# Patient Record
(demographics unavailable — no encounter records)

---

## 2024-12-02 NOTE — HISTORY OF PRESENT ILLNESS
[FreeTextEntry1] : Referred by Dr. Ramos  77F with PMH of asthma, OA, fatty liver, chronic dizziness, HTN, HLD, T2DM, Stage 3 CKD (baseline Cr 1.5-1.9), chronic anemia (baseline Hg ~10), Afib s/p DCCV on 9/30/24 (on Xarelto), bifasicular block (LAFB and RBBB), non-obstructive CAD, HFpEF, moderate to severe MR, and severe LFLG AS (TTE 7/12/24: LVEF 57%, PV 3.49, P/MG 49/26, KRISTEN 0.63, SVi 29.23), referred for consultation of treatment options for severe AS.    Admitted at [facility] early July, 2024 for ADHF, underwent an Echo 7/9 revealing EF 50-55%, Trileaflet aortic valve with reduced systolic excursion and Severe aortic stenosis. was then transferred to [facility] for cardiac cath on 7/10.   Pt was also seen at the ED on or about 7/22 for hematuria which resolved.  Admitted at [facility] from Sept 22-Oct 2, 2024 with ADHF, NEHEMIAS, and asthma exac. Per discharge summary:  "iso CHF exacerbation 2/2 severe AS and PAH. Cardio, pulm and nephro consulted. CT chest remarkable for mosaic attenuation, scattered linear atelectasis/scarring. 3.5 cm pancreatic tail cyst. Patient started hydralazine 25mg q8h and IV steroids 40 q8h, and singular qd. Patient supplemented w/ K powder for hypokalemia and started on home dose of KCl. Transitioned from furosemide to torsemide due to better bioavailability. Patient was pending transfer to [facility] for TAVR, however, on 9/28, day 7 of hospitalization, patient noted to be tachy-mj on telemetry, HR 30-150s and started on amio loading. Patient had TTE guided cardioversion on 10/1, revealing LVEF 55-60%, severe AS and moderate to severe mitral regurg. Dr. Tomlin was consulted for TAVR and mitral valve replacement for a [facility] transfer, however, patient opted to have outpatient follow-up. Patient had a total of 4 during hospital stay. ================================= Cardiac Cath 7/10/24 Conclusion:  Mild CAD - 40% mid RCA  LV-AO peak gradient 23mmHg  TTE 7/12/24 Conclusion: 1. Technically difficult image quality. 2. Left ventricular cavity is normal in size. Left ventricular wall thickness is normal. Left ventricular systolic function is normal with an ejection fraction of 57 % by Chahal's method of disks. There are no regional wall motion abnormalities seen. 3. The right ventricle is not well visualized. 4. Structurally normal mitral valve with normal leaflet excursion. There is calcification of the mitral valve annulus. There is trace mitral regurgitation. 5. The aortic valve anatomy cannot be determined with reduced systolic excursion. There is calcification of the aortic valve leaflets. There is severe aortic stenosis. The peak transaortic velocity is 3.49 m/s, peak transaortic gradient is 48.7 mmHg and mean transaortic gradient is 26.0 mmHg with an LVOT/aortic valve VTI ratio of 0.22. The aortic valve area is estimated at 0.63 cm by the continuity equation. There is mild aortic regurgitation. 6. The left atrium is mildly dilated with an indexed volume of 38.98 ml/m. 7. Structurally normal tricuspid valve with normal leaflet excursion. There is mild to moderate tricuspid regurgitation. Estimated pulmonary artery systolic pressure is 52 mmHg, consistent with moderate pulmonary hypertension.  MARCY 9/30/24 CONCLUSIONS: 1. Left ventricular wall thickness is normal. Left ventricular systolic function is normal with an ejection fraction visually estimated at 55 to 60 %. There are no regional wall motion abnormalities seen. 2. Normal right ventricular cavity size, with normal wall thickness, and normal right ventricular systolic function. 3. Moderate to severe mitral regurgitation. The mitral regurgitant jet is eccentrically directed. 4. Left atrium is moderately dilated. 5. The right atrium is dilated. 6. Mild tricuspid regurgitation. 7. Mild left ventricular hypertrophy. 8. No evidence of left atrial or left atrial appendage thrombus. The left atrial appendage emptying velocity is low. 9. Normal pulmonary venous flow. 10. Severe aortic stenosis.The effective orifice area is estimated at 0.9 cm by 2D planimetry. 11. No pericardial effusion seen. 12. Grade I atheroma in descending aorta.  EKG 9/26/24: SB with SA, LAFB, RBBB rate 59, , , QTc 524  10/2/24 labs reviewed:  WBC 9.33 H/H 10.4/33.4 Plt 173 BUN/Cr 39/1.73 =================================  Today, patient reports that since being home, she has noted progressive dyspnea on exertion once again. She also reports orthopnea requiring her to sleep in a recliner. She has worsened LE edema. She denies chest pain, palpitations. She has been compliant with her medications.   [x] KCCQ - completed [x] 5MWT - 13, 10, 12 seconds [x] echo [ ] TAVR scans [ ] carotid US [ ] CT surgeon

## 2024-12-02 NOTE — PHYSICAL EXAM
[Well Developed] : well developed [Well Nourished] : well nourished [No Acute Distress] : no acute distress [Normal S1, S2] : normal S1, S2 [No Respiratory Distress] : no respiratory distress  [Normal Gait] : normal gait [Edema ___] : edema [unfilled] [Normal] : no rash, no skin lesions [Moves all extremities] : moves all extremities [No Focal Deficits] : no focal deficits [Alert and Oriented] : alert and oriented [Normal memory] : normal memory [de-identified] : Elevated JVP [de-identified] : Late peaking systolic murmur 3/6  [de-identified] : bibasilar crackles

## 2024-12-02 NOTE — HISTORY OF PRESENT ILLNESS
[FreeTextEntry1] : Referred by Dr. Ramos  77F with PMH of asthma, OA, fatty liver, chronic dizziness, HTN, HLD, T2DM, Stage 3 CKD (baseline Cr 1.5-1.9), chronic anemia (baseline Hg ~10), Afib s/p DCCV on 9/30/24 (on Xarelto), bifasicular block (LAFB and RBBB), non-obstructive CAD, HFpEF, moderate to severe MR, and severe LFLG AS (TTE 7/12/24: LVEF 57%, PV 3.49, P/MG 49/26, KRISTEN 0.63, SVi 29.23), referred for consultation of treatment options for severe AS.    Admitted at [facility] early July, 2024 for ADHF, underwent an Echo 7/9 revealing EF 50-55%, Trileaflet aortic valve with reduced systolic excursion and Severe aortic stenosis. was then transferred to [facility] for cardiac cath on 7/10.   Pt was also seen at the ED on or about 7/22 for hematuria which resolved.  Admitted at [facility] from Sept 22-Oct 2, 2024 with ADHF, NEHEMIAS, and asthma exac. Per discharge summary:  "iso CHF exacerbation 2/2 severe AS and PAH. Cardio, pulm and nephro consulted. CT chest remarkable for mosaic attenuation, scattered linear atelectasis/scarring. 3.5 cm pancreatic tail cyst. Patient started hydralazine 25mg q8h and IV steroids 40 q8h, and singular qd. Patient supplemented w/ K powder for hypokalemia and started on home dose of KCl. Transitioned from furosemide to torsemide due to better bioavailability. Patient was pending transfer to [facility] for TAVR, however, on 9/28, day 7 of hospitalization, patient noted to be tachy-mj on telemetry, HR 30-150s and started on amio loading. Patient had TTE guided cardioversion on 10/1, revealing LVEF 55-60%, severe AS and moderate to severe mitral regurg. Dr. Tomlin was consulted for TAVR and mitral valve replacement for a [facility] transfer, however, patient opted to have outpatient follow-up. Patient had a total of 4 during hospital stay. ================================= Cardiac Cath 7/10/24 Conclusion:  Mild CAD - 40% mid RCA  LV-AO peak gradient 23mmHg  TTE 7/12/24 Conclusion: 1. Technically difficult image quality. 2. Left ventricular cavity is normal in size. Left ventricular wall thickness is normal. Left ventricular systolic function is normal with an ejection fraction of 57 % by Chahal's method of disks. There are no regional wall motion abnormalities seen. 3. The right ventricle is not well visualized. 4. Structurally normal mitral valve with normal leaflet excursion. There is calcification of the mitral valve annulus. There is trace mitral regurgitation. 5. The aortic valve anatomy cannot be determined with reduced systolic excursion. There is calcification of the aortic valve leaflets. There is severe aortic stenosis. The peak transaortic velocity is 3.49 m/s, peak transaortic gradient is 48.7 mmHg and mean transaortic gradient is 26.0 mmHg with an LVOT/aortic valve VTI ratio of 0.22. The aortic valve area is estimated at 0.63 cm by the continuity equation. There is mild aortic regurgitation. 6. The left atrium is mildly dilated with an indexed volume of 38.98 ml/m. 7. Structurally normal tricuspid valve with normal leaflet excursion. There is mild to moderate tricuspid regurgitation. Estimated pulmonary artery systolic pressure is 52 mmHg, consistent with moderate pulmonary hypertension.  MARCY 9/30/24 CONCLUSIONS: 1. Left ventricular wall thickness is normal. Left ventricular systolic function is normal with an ejection fraction visually estimated at 55 to 60 %. There are no regional wall motion abnormalities seen. 2. Normal right ventricular cavity size, with normal wall thickness, and normal right ventricular systolic function. 3. Moderate to severe mitral regurgitation. The mitral regurgitant jet is eccentrically directed. 4. Left atrium is moderately dilated. 5. The right atrium is dilated. 6. Mild tricuspid regurgitation. 7. Mild left ventricular hypertrophy. 8. No evidence of left atrial or left atrial appendage thrombus. The left atrial appendage emptying velocity is low. 9. Normal pulmonary venous flow. 10. Severe aortic stenosis.The effective orifice area is estimated at 0.9 cm by 2D planimetry. 11. No pericardial effusion seen. 12. Grade I atheroma in descending aorta.  EKG 9/26/24: SB with SA, LAFB, RBBB rate 59, , , QTc 524  10/2/24 labs reviewed:  WBC 9.33 H/H 10.4/33.4 Plt 173 BUN/Cr 39/1.73 =================================  Today, patient reports that since being home, she has noted progressive dyspnea on exertion once again. She also reports orthopnea requiring her to sleep in a recliner. She has worsened LE edema. She denies chest pain, palpitations. She has been compliant with her medications.   [x] KCCQ - completed [x] 5MWT - 13, 10, 12 seconds [x] echo [ ] TAVR scans [ ] carotid US [ ] CT surgeon

## 2024-12-02 NOTE — PHYSICAL EXAM
[Well Developed] : well developed [Well Nourished] : well nourished [No Acute Distress] : no acute distress [Normal S1, S2] : normal S1, S2 [No Respiratory Distress] : no respiratory distress  [Normal Gait] : normal gait [Edema ___] : edema [unfilled] [Normal] : no rash, no skin lesions [Moves all extremities] : moves all extremities [No Focal Deficits] : no focal deficits [Alert and Oriented] : alert and oriented [Normal memory] : normal memory [de-identified] : Elevated JVP [de-identified] : Late peaking systolic murmur 3/6  [de-identified] : bibasilar crackles

## 2024-12-02 NOTE — PLAN
[TextEntry] : - Labs today in the office - Increase Bumex to 1mg BID - Plan to directly admit to the hospital for diuresis, medical optimization, and further TAVR workup. - Will discuss plan with Dr. Rachel FINLEY, Dr. Chucho Perdomo, personally performed the evaluation and management (E/M) services for this new patient. That E/M includes conducting the clinically appropriate initial history &/or exam, assessing all conditions, and establishing the plan of care. Today, my MAMADOU, noted herewith, was here to observe my evaluation and management service for this patient & follow plan of care established by me going forward.   
Additional Notes: Patient consent was obtained to proceed with the visit and recommended plan of care after discussion of all risks and benefits, including the risks of COVID-19 exposure.
Detail Level: Simple

## 2024-12-02 NOTE — PHYSICAL EXAM
[Well Developed] : well developed [Well Nourished] : well nourished [No Acute Distress] : no acute distress [Normal S1, S2] : normal S1, S2 [No Respiratory Distress] : no respiratory distress  [Normal Gait] : normal gait [Edema ___] : edema [unfilled] [Normal] : no rash, no skin lesions [Moves all extremities] : moves all extremities [No Focal Deficits] : no focal deficits [Alert and Oriented] : alert and oriented [Normal memory] : normal memory [de-identified] : Elevated JVP [de-identified] : Late peaking systolic murmur 3/6  [de-identified] : bibasilar crackles

## 2024-12-02 NOTE — HISTORY OF PRESENT ILLNESS
[FreeTextEntry1] : Referred by Dr. Ramos  77F with PMH of asthma, OA, fatty liver, chronic dizziness, HTN, HLD, T2DM, Stage 3 CKD (baseline Cr 1.5-1.9), chronic anemia (baseline Hg ~10), Afib s/p DCCV on 9/30/24 (on Xarelto), bifasicular block (LAFB and RBBB), non-obstructive CAD, HFpEF, moderate to severe MR, and severe LFLG AS (TTE 7/12/24: LVEF 57%, PV 3.49, P/MG 49/26, KRISTEN 0.63, SVi 29.23), referred for consultation of treatment options for severe AS.    Admitted at [facility] early July, 2024 for ADHF, underwent an Echo 7/9 revealing EF 50-55%, Trileaflet aortic valve with reduced systolic excursion and Severe aortic stenosis. was then transferred to [facility] for cardiac cath on 7/10.   Pt was also seen at the ED on or about 7/22 for hematuria which resolved.  Admitted at [facility] from Sept 22-Oct 2, 2024 with ADHF, NEHEMIAS, and asthma exac. Per discharge summary:  "iso CHF exacerbation 2/2 severe AS and PAH. Cardio, pulm and nephro consulted. CT chest remarkable for mosaic attenuation, scattered linear atelectasis/scarring. 3.5 cm pancreatic tail cyst. Patient started hydralazine 25mg q8h and IV steroids 40 q8h, and singular qd. Patient supplemented w/ K powder for hypokalemia and started on home dose of KCl. Transitioned from furosemide to torsemide due to better bioavailability. Patient was pending transfer to [facility] for TAVR, however, on 9/28, day 7 of hospitalization, patient noted to be tachy-mj on telemetry, HR 30-150s and started on amio loading. Patient had TTE guided cardioversion on 10/1, revealing LVEF 55-60%, severe AS and moderate to severe mitral regurg. Dr. Tomlin was consulted for TAVR and mitral valve replacement for a [facility] transfer, however, patient opted to have outpatient follow-up. Patient had a total of 4 during hospital stay. ================================= Cardiac Cath 7/10/24 Conclusion:  Mild CAD - 40% mid RCA  LV-AO peak gradient 23mmHg  TTE 7/12/24 Conclusion: 1. Technically difficult image quality. 2. Left ventricular cavity is normal in size. Left ventricular wall thickness is normal. Left ventricular systolic function is normal with an ejection fraction of 57 % by Chahal's method of disks. There are no regional wall motion abnormalities seen. 3. The right ventricle is not well visualized. 4. Structurally normal mitral valve with normal leaflet excursion. There is calcification of the mitral valve annulus. There is trace mitral regurgitation. 5. The aortic valve anatomy cannot be determined with reduced systolic excursion. There is calcification of the aortic valve leaflets. There is severe aortic stenosis. The peak transaortic velocity is 3.49 m/s, peak transaortic gradient is 48.7 mmHg and mean transaortic gradient is 26.0 mmHg with an LVOT/aortic valve VTI ratio of 0.22. The aortic valve area is estimated at 0.63 cm by the continuity equation. There is mild aortic regurgitation. 6. The left atrium is mildly dilated with an indexed volume of 38.98 ml/m. 7. Structurally normal tricuspid valve with normal leaflet excursion. There is mild to moderate tricuspid regurgitation. Estimated pulmonary artery systolic pressure is 52 mmHg, consistent with moderate pulmonary hypertension.  MARCY 9/30/24 CONCLUSIONS: 1. Left ventricular wall thickness is normal. Left ventricular systolic function is normal with an ejection fraction visually estimated at 55 to 60 %. There are no regional wall motion abnormalities seen. 2. Normal right ventricular cavity size, with normal wall thickness, and normal right ventricular systolic function. 3. Moderate to severe mitral regurgitation. The mitral regurgitant jet is eccentrically directed. 4. Left atrium is moderately dilated. 5. The right atrium is dilated. 6. Mild tricuspid regurgitation. 7. Mild left ventricular hypertrophy. 8. No evidence of left atrial or left atrial appendage thrombus. The left atrial appendage emptying velocity is low. 9. Normal pulmonary venous flow. 10. Severe aortic stenosis.The effective orifice area is estimated at 0.9 cm by 2D planimetry. 11. No pericardial effusion seen. 12. Grade I atheroma in descending aorta.  EKG 9/26/24: SB with SA, LAFB, RBBB rate 59, , , QTc 524  10/2/24 labs reviewed:  WBC 9.33 H/H 10.4/33.4 Plt 173 BUN/Cr 39/1.73 =================================  Today, patient reports that since being home, she has noted progressive dyspnea on exertion once again. She also reports orthopnea requiring her to sleep in a recliner. She has worsened LE edema. She denies chest pain, palpitations. She has been compliant with her medications.   [x] KCCQ - completed [x] 5MWT - 13, 10, 12 seconds [x] echo [ ] TAVR scans [ ] carotid US [ ] CT surgeon

## 2024-12-02 NOTE — ASSESSMENT
[FreeTextEntry1] : 77F with PMH of asthma, OA, fatty liver, chronic dizziness, HTN, HLD, T2DM, Stage 3 CKD (baseline Cr 1.5-1.9), chronic anemia (baseline Hg ~10), Afib s/p DCCV on 9/30/24 (on Xarelto), bifasicular block (LAFB and RBBB), non-obstructive CAD, HFpEF, moderate to severe MR, and severe LFLG AS (TTE 7/12/24: LVEF 57%, PV 3.49, P/MG 49/26, KRISTEN 0.63, SVi 29.23), referred for consultation of treatment options for severe AS. Her echocardiogram in the office today reveals normal LV and RV function, severe AS with KRISTEN 0.8, MG 42, Vmax 4.2. She is also markedly volume overloaded on exam with symptoms of ongoing dyspnea and orthopnea. We will plan to admit her to the hospital directly for aggressive diuresis and further workup for TAVR.

## 2024-12-02 NOTE — REVIEW OF SYSTEMS
[Feeling Fatigued] : feeling fatigued [SOB] : shortness of breath [Dyspnea on exertion] : dyspnea during exertion [Lower Ext Edema] : lower extremity edema [Orthopnea] : orthopnea [Negative] : Heme/Lymph [Fever] : no fever [Headache] : no headache [Chills] : no chills [Chest Discomfort] : no chest discomfort [Palpitations] : no palpitations [PND] : no PND [Syncope] : no syncope [FreeTextEntry6] : dyspnea

## 2024-12-27 NOTE — HISTORY OF PRESENT ILLNESS
[FreeTextEntry1] : Hospital Course: Discharge Date 24-Dec-2024 Admission Date 19-Dec-2024 17:55 Reason for Admission severe AS Hospital Course Patient discussed on morning rounds with Dr. Perdomo Operation Date: 12/20/24 TAVR Primary Surgeon/Attending MD: Dr. Perdomo / Dr. Mancilla Referring Physician: Dr Ramos _ _ _ _ _ _ _ _ _ _ _ _ HOSPITAL COURSE: 77 year old female with history of asthma, HTN, HLD, DM2, CKD3, chronic anemia, AFib s/p DCCV 09/2024 (though documented that patient had some AFib in early December as well) on Xarelto, HFpEF, moderate to severe MR, and severe low-flow low-gradient AS, who was found by her home nurse to be bradycardic in the 40's associated with dizziness and was sent to [facility] ED 12/19 and was further admitted to structural heart service for TAVR work up. In preop setting, Heparin drip started for afib while off NOAC. Patient also with persisting bradycardia and Hx of bifascicular block and wide QRS of 154, EP consulted for likely pacemaker implantation after TAVR. She underwent Transfemoral (RCFA) TAVR with 23mm Tawny valve on 12/20/24 with Dr. Perdomo / Dr. Mancilla. Intraop course was uneventful. Post procedure TTE on POD#1 showed TAVR valve is seen in the aortic position with apparent normal function, without evidence of prosthetic dysfunction, and trivial pericardial effusion. TVP via RIJ was maintained in placed until 12/23 when patient underwent uncomplicated dual chamber MRI conditionally approved Medtronic conduction system pacemaker. Otherwise, patient recovered well and AVNB were resumed. Repeat TTE on day of discharge did not show pericardial effusion. AC plan discussed with EP and patient was cleared to restart NOAC (Xarelto) on 12/24/24.  Per Dr. Perdomo, patient is stable for discharge with follow up. _ _ _ _ _ _ _ _ _ _ _ _ Today patient was seen ( present) s/p discharge from  [facility]. Patient informed they are being followed by [facility] program. Time was spent with the patient reviewing the discharge material including medications, follow up appointments, recovery, concerning symptoms, and how to contact me should they have questions.

## 2024-12-27 NOTE — ASSESSMENT
[FreeTextEntry1] : Pt recovering well at home s/p TAVR . Reviewed all medications and dosages with pt understanding. Pt dispenses meds appropriately into med dispenser each week. Pt has all medications in home and is taking as prescribed.  Reports BS is 100 this moring after breakfast. Reviewed iportace of daily weights and DASH diet. Patient is ambulating independently.    Denies pain at this time. No new symptoms, issues or concerns to report at this time. Appt schedule reviewed with pt verbalizing understanding.

## 2024-12-27 NOTE — PHYSICAL EXAM
[Neck Appearance] : the appearance of the neck was normal [] : no respiratory distress [FreeTextEntry1] : ppm site c/d/i [FreeTextEntry2] : Skin clean, dry and healing well. The groin incision had no active bleeding,, no purulent drainage and no serosanguineous drainage. [Abnormal Walk] : normal gait [Skin Color & Pigmentation] : normal skin color and pigmentation [No Focal Deficits] : no focal deficits [Oriented To Time, Place, And Person] : oriented to person, place, and time

## 2025-01-03 NOTE — DATA REVIEWED
[FreeTextEntry1] : Cardiac Cath 7/10/24 Conclusion:  Mild CAD - 40% mid RCA  LV-AO peak gradient 23mmHg  TTE 7/12/24 Conclusion: 1. Technically difficult image quality. 2. Left ventricular cavity is normal in size. Left ventricular wall thickness is normal. Left ventricular systolic function is normal with an ejection fraction of 57 % by Chahal's method of disks. There are no regional wall motion abnormalities seen. 3. The right ventricle is not well visualized. 4. Structurally normal mitral valve with normal leaflet excursion. There is calcification of the mitral valve annulus. There is trace mitral regurgitation. 5. The aortic valve anatomy cannot be determined with reduced systolic excursion. There is calcification of the aortic valve leaflets. There is severe aortic stenosis. The peak transaortic velocity is 3.49 m/s, peak transaortic gradient is 48.7 mmHg and mean transaortic gradient is 26.0 mmHg with an LVOT/aortic valve VTI ratio of 0.22. The aortic valve area is estimated at 0.63 cm by the continuity equation. There is mild aortic regurgitation. 6. The left atrium is mildly dilated with an indexed volume of 38.98 ml/m. 7. Structurally normal tricuspid valve with normal leaflet excursion. There is mild to moderate tricuspid regurgitation. Estimated pulmonary artery systolic pressure is 52 mmHg, consistent with moderate pulmonary hypertension.  MARCY 9/30/24 CONCLUSIONS: 1. Left ventricular wall thickness is normal. Left ventricular systolic function is normal with an ejection fraction visually estimated at 55 to 60 %. There are no regional wall motion abnormalities seen. 2. Normal right ventricular cavity size, with normal wall thickness, and normal right ventricular systolic function. 3. Moderate to severe mitral regurgitation. The mitral regurgitant jet is eccentrically directed. 4. Left atrium is moderately dilated. 5. The right atrium is dilated. 6. Mild tricuspid regurgitation. 7. Mild left ventricular hypertrophy. 8. No evidence of left atrial or left atrial appendage thrombus. The left atrial appendage emptying velocity is low. 9. Normal pulmonary venous flow. 10. Severe aortic stenosis. The effective orifice area is estimated at 0.9 cm by 2D planimetry. 11. No pericardial effusion seen. 12. Grade I atheroma in descending aorta.  EKG 9/26/24: SB with SA, LAFB, RBBB rate 59, , , QTc 524  Echo 7/9/24: EF 50-55%, Trileaflet aortic valve with reduced systolic excursion and Severe aortic stenosis.

## 2025-01-03 NOTE — PLAN
[TextEntry] : (1) Increase Coreg to 12.5 mg PO BID; will record AM BP and afternoon BP.  (2) Return to [facility] in three weeks with ECHO, EKG and labs.  (3) Continue current medication regimen and cardiology care with Dr. Ramos. (4) Abx prophylaxis with dental procedures.

## 2025-01-03 NOTE — REVIEW OF SYSTEMS
[Dyspnea on exertion] : dyspnea during exertion [Feeling Fatigued] : feeling fatigued [Negative] : Musculoskeletal [Fever] : no fever [Headache] : no headache [Chills] : no chills [SOB] : no shortness of breath [Chest Discomfort] : no chest discomfort [Lower Ext Edema] : no extremity edema [Leg Claudication] : no intermittent leg claudication [Palpitations] : no palpitations [Orthopnea] : no orthopnea [PND] : no PND [Syncope] : no syncope [FreeTextEntry6] : dyspnea

## 2025-01-03 NOTE — REASON FOR VISIT
[Home] : at home, [unfilled] , at the time of the visit. [Medical Office: ([facility])___] : at the medical office located in  [Family Member] : family member [Patient] : the patient [Self] : self [Follow-Up: _____] : a [unfilled] follow-up visit [FreeTextEntry4] : Miguel Newman [FreeTextEntry1] : s/p TAVR

## 2025-01-03 NOTE — PHYSICAL EXAM
[Well Developed] : well developed [Well Nourished] : well nourished [No Acute Distress] : no acute distress [Normal S1, S2] : normal S1, S2 [No Respiratory Distress] : no respiratory distress  [Normal Gait] : normal gait [Edema ___] : edema [unfilled] [Normal] : no rash, no skin lesions [Moves all extremities] : moves all extremities [No Focal Deficits] : no focal deficits [Alert and Oriented] : alert and oriented [Normal memory] : normal memory [de-identified] : Elevated JVP [de-identified] : Late peaking systolic murmur 3/6  [de-identified] : bibasilar crackles [TextEntry] : Not done as telehealth visit.

## 2025-01-03 NOTE — HISTORY OF PRESENT ILLNESS
[FreeTextEntry1] : Referred by Dr. Ramos  77 year old female with a PMH of asthma, OA, fatty liver, chronic dizziness, HTN, HLD, T2DM, Stage 3 CKD (baseline Cr 1.5-1.9), chronic anemia (baseline Hg ~10), atrial fibrillation s/p DCCV on 9/30/24 (on Xarelto), bifasicular block (LAFB and RBBB), non-obstructive CAD and chronic diastolic heart failure with severe low flow, low gradient aortic stenosis s/p transfemoral TAVR with Tawny Ultra (23mm) on 12/20/24 c/b CHB followed by dual-chamber Medtronic PPM on 12/23/24 who presents for follow up after discharge.   The patient was admitted to [facility] on 12/2 for heart failure optimization after being seen in clinic with marked volume overload and symptoms of ongoing dyspnea and orthopnea. Over a week, the patient underwent careful diuresis in the setting of fluctuating Cr. After adequate diuresis, the patient was discharged home with a plan to return for TAVR. Renal recommended discharge home on Bumex 1mg PRN and Coreg 6.25 mg BID (1/2 home dose).   Prior to her planned admission for TAVR, the patient was found by her home nurse to be bradycardic in the 40's associated with dizziness and was sent to [facility] ED on 12/19.  Given the persistent bradycardia and a history of bifascicular block with QRS of 154, EP was consulted for likely pacemaker implantation after TAVR. The patient underwent TAVR on 12/20/24 without complications but went into complete heart block. TVP via RIJ was maintained in placed until 12/23 when patient underwent uncomplicated dual chamber Medtronic pacemaker. She was deemed stable for discharge home on 12/24/24. ECHO done prior to discharge showed the following:  ECHO, 12/24/24: Normal left ventricular size and systolic function. Mild symmetric left ventricular hypertrophy. Grade II left ventricular diastolic dysfunction. Normal right ventricular size and systolic function. Device leads are noted in the right heart. Biatrial enlargement. 23 mm Tawny 3 Ultra valve is noted in the aortic position without any aortic regurgitation. The peak transvalvular velocity is 2.41 m/s, the mean transvalvular gradient is 12.00 mmHg, and the LVOT/AV velocity ratio is 0.53. The peak transaortic gradient is 23.23 mmHg. Anterior mitral leaflet is likely mildly prolapsed at A2 scallop, not well visualized. Mild eccentric mitral regurgitation. No pericardial effusion.  Since discharge, the patient states she is feeling better. She denies any chest pain and reports her RODRIGUEZ has markedly improved. She reports some mild SOB with activity; denies any SOB at rest. The patient also denies orthopnea, PND, dizziness, syncope, LE edema and palpitations. The groin sites are healing well; denies pain, bleeding and swelling. The PPM site has bruising but denies any additional swelling or significant pain. She reports her SBP in the mornings has been high, ranging from 165 to low 180s. After morning medications, it remains high in the 170s.

## 2025-01-03 NOTE — ASSESSMENT
[FreeTextEntry1] : 77 year old female with a PMH of asthma, OA, fatty liver, chronic dizziness, HTN, HLD, T2DM, Stage 3 CKD (baseline Cr 1.5-1.9), chronic anemia (baseline Hg ~10), atrial fibrillation s/p DCCV on 9/30/24 (on Xarelto), bifasicular block (LAFB and RBBB), non-obstructive CAD and chronic diastolic heart failure with severe low flow, low gradient aortic stenosis s/p transfemoral TAVR with Tawny Ultra (23mm) on 12/20/24 c/b CHB followed by dual-chamber Medtronic PPM on 12/23/24 who presents for follow up after discharge. The patient is doing well since discharge. She is clinically stable; NYHA Class II. Her RODRIGUEZ is improving and has no signs/symptoms of volume overload. The ECHO done prior to discharge was reviewed. We reviewed her home BP recordings and has been high. Patient to increase Coreg to 12.5mg PO BID (prior home dose). I instructed her to record AM BP prior to medication and then again several hours later; will call our office if SBP remains high and/or is lower than 115. Instructions were also reviewed with her son, Trent. The patient will return to D clinic in three weeks with ECHO, EKG and labs. All questions were answered.

## 2025-01-03 NOTE — PHYSICAL EXAM
[Well Developed] : well developed [Well Nourished] : well nourished [No Acute Distress] : no acute distress [Normal S1, S2] : normal S1, S2 [No Respiratory Distress] : no respiratory distress  [Normal Gait] : normal gait [Edema ___] : edema [unfilled] [Normal] : no rash, no skin lesions [Moves all extremities] : moves all extremities [No Focal Deficits] : no focal deficits [Alert and Oriented] : alert and oriented [Normal memory] : normal memory [de-identified] : Elevated JVP [de-identified] : Late peaking systolic murmur 3/6  [de-identified] : bibasilar crackles [TextEntry] : Not done as telehealth visit.

## 2025-01-03 NOTE — HISTORY OF PRESENT ILLNESS
[FreeTextEntry1] : Referred by Dr. Ramos  77 year old female with a PMH of asthma, OA, fatty liver, chronic dizziness, HTN, HLD, T2DM, Stage 3 CKD (baseline Cr 1.5-1.9), chronic anemia (baseline Hg ~10), atrial fibrillation s/p DCCV on 9/30/24 (on Xarelto), bifasicular block (LAFB and RBBB), non-obstructive CAD and chronic diastolic heart failure with severe low flow, low gradient aortic stenosis s/p transfemoral TAVR with Tawny Ultra (23mm) on 12/20/24 c/b CHB followed by dual-chamber Medtronic PPM on 12/23/24 who presents for follow up after discharge.   The patient was admitted to [facility] on 12/2 for heart failure optimization after being seen in clinic with marked volume overload and symptoms of ongoing dyspnea and orthopnea. Over a week, the patient underwent careful diuresis in the setting of fluctuating Cr. After adequate diuresis, the patient was discharged home with a plan to return for TAVR. Renal recommended discharge home on Bumex 1mg PRN and Coreg 6.25 mg BID (1/2 home dose).   Prior to her planned admission for TAVR, the patient was found by her home nurse to be bradycardic in the 40's associated with dizziness and was sent to [facility] ED on 12/19.  Given the persistent bradycardia and a history of bifascicular block with QRS of 154, EP was consulted for likely pacemaker implantation after TAVR. The patient underwent TAVR on 12/20/24 without complications but went into complete heart block. TVP via RIJ was maintained in placed until 12/23 when patient underwent uncomplicated dual chamber Medtronic pacemaker. She was deemed stable for discharge home on 12/24/24. ECHO done prior to discharge showed the following:  ECHO, 12/24/24: Normal left ventricular size and systolic function. Mild symmetric left ventricular hypertrophy. Grade II left ventricular diastolic dysfunction. Normal right ventricular size and systolic function. Device leads are noted in the right heart. Biatrial enlargement. 23 mm Tawny 3 Ultra valve is noted in the aortic position without any aortic regurgitation. The peak transvalvular velocity is 2.41 m/s, the mean transvalvular gradient is 12.00 mmHg, and the LVOT/AV velocity ratio is 0.53. The peak transaortic gradient is 23.23 mmHg. Anterior mitral leaflet is likely mildly prolapsed at A2 scallop, not well visualized. Mild eccentric mitral regurgitation. No pericardial effusion.  Since discharge, the patient states she is feeling better. She denies any chest pain and reports her RODRIGUEZ has markedly improved. She reports some mild SOB with activity; denies any SOB at rest. The patient also denies orthopnea, PND, dizziness, syncope, LE edema and palpitations. The groin sites are healing well; denies pain, bleeding and swelling. The PPM site has bruising but denies any additional swelling or significant pain. She reports her SBP in the mornings has been high, ranging from 165 to low 180s. After morning medications, it remains high in the 170s.

## 2025-01-03 NOTE — REASON FOR VISIT
[Home] : at home, [unfilled] , at the time of the visit. [Medical Office: ([facility])___] : at the medical office located in  [Family Member] : family member [Patient] : the patient [Self] : self [Follow-Up: _____] : a [unfilled] follow-up visit [FreeTextEntry4] : Miguel Newman [FreeTextEntry1] : s/p TAVR none

## 2025-01-22 NOTE — PROCEDURE
[No] : not [Atrial Fibrillation] : atrial fibrillation [Pacemaker] : pacemaker [Medtronic] : Medtronic [de-identified] : 12/23/24 [de-identified] : see paceart

## 2025-01-22 NOTE — HISTORY OF PRESENT ILLNESS
[None] : The patient complains of no symptoms [de-identified] : 77 year old female with history of asthma, HTN, HLD, DM2, CKD3, chronic anemia, AFib s/p DCCV 09/2024 (though documented that patient had some AFib in early December as well) on Xarelto, HFpEF, moderate to severe MR, and severe low-flow low-gradient AS, who was found by her home nurse to be bradycardic in the 40's associated with dizziness and was sent to [facility] ED 12/19 and was further admitted to structural heart service for TAVR work up. Baseline rhythm of sinus bradycardia and bifascicular block (QRS 154ms). She underwent Transfemoral (RCFA) TAVR with 23mm Tawny valve on 12/20/24 with Dr. Perdomo / Dr. Mancilla. On 12/23 she underwent PPM-D w/ CSP (Medtronic). She presents today for wound/device check.   She offers no device related complaints. Denies pain, bleeding, redness, swelling, or pain. She has her bedside monitor pluggedin. She presents with her son-in-law who is serving as her .

## 2025-01-24 NOTE — HISTORY OF PRESENT ILLNESS
[FreeTextEntry1] : 78 y/o F with hx of HTN, HLD, DM, CKD3 (baseline Cr 1.5-1.9), chronic anemia, AFIB s/p DCCV on 9/30/24 (on Xarelto), bifascicular block (LAFB and RBBB), non-obstructive CAD, HFpEF, moderate to severe MR, asthma, OA, fallty liver, chronic dizziness

## 2025-02-26 NOTE — END OF VISIT
[FreeTextEntry3] : I, Dr. Luis Bishop, personally performed the evaluation and management (E/M) services for this established patient who presents today with (a) new problem(s)/exacerbation of (an) existing condition(s). That E/M includes conducting the clinically appropriate interval history &/or exam, assessing all new/exacerbated conditions, and establishing a new plan of care. Today, my MAMADOU, noted herewith, was here to observe my evaluation and management service for this new problem/exacerbated condition and follow the plan of care established by me going forward.

## 2025-02-26 NOTE — ASSESSMENT
[FreeTextEntry1] : * 78 y/o F with hx of HTN, HLD, DM, CKD3 (baseline Cr 1.5-1.9), chronic anemia, AFIB s/p DCCV on 9/30/24 (on Xarelto), bifascicular block (LAFB and RBBB), non-obstructive CAD, HFpEF, moderate to severe MR, asthma, OA, fallty liver, chronic dizziness  Recent laboratory data reviewed and discussed with patient dated Feb 6, 2025 - Rec on lifestyle modification, i.e, exercises, diet  etc. The patient has been counseled to check their BP at home with an automatic arm cuff, write down the readings and to bring in all blood pressure readings   Blood pressure- acceptable - upcoming CARDIO appt  CKD3 eGFR 34  creatinine 1.57  To do blood works in 2 weeks

## 2025-02-26 NOTE — ASSESSMENT
[FreeTextEntry1] : * 76 y/o F with hx of HTN, HLD, DM, CKD3 (baseline Cr 1.5-1.9), chronic anemia, AFIB s/p DCCV on 9/30/24 (on Xarelto), bifascicular block (LAFB and RBBB), non-obstructive CAD, HFpEF, moderate to severe MR, asthma, OA, fallty liver, chronic dizziness  Recent laboratory data reviewed and discussed with patient dated Feb 6, 2025 - Rec on lifestyle modification, i.e, exercises, diet  etc. The patient has been counseled to check their BP at home with an automatic arm cuff, write down the readings and to bring in all blood pressure readings   Blood pressure- acceptable - upcoming CARDIO appt  CKD3 eGFR 34  creatinine 1.57  To do blood works in 2 weeks

## 2025-02-26 NOTE — HISTORY OF PRESENT ILLNESS
[FreeTextEntry1] : 76 y/o F with hx of HTN, HLD, DM, CKD3 (baseline Cr 1.5-1.9), chronic anemia, AFIB s/p DCCV on 9/30/24 (on Xarelto), bifascicular block (LAFB and RBBB), non-obstructive CAD, HFpEF, moderate to severe MR, asthma, OA, fallty liver, chronic dizziness being seen for f/u LCV Feb 6, 2025  Patient still has sleep problem - generally better  Her weight decreased from 220 to 210 lbs  She will see CARDIO/Dr Perdomo next month

## 2025-03-18 NOTE — PHYSICAL EXAM
Clothing [No Acute Distress] : no acute distress [Normal Conjunctiva] : normal conjunctiva [No Carotid Bruit] : no carotid bruit [Clear Lung Fields] : clear lung fields [Good Air Entry] : good air entry [Soft] : abdomen soft [Non Tender] : non-tender [Normal Gait] : normal gait [No Rash] : no rash [Moves all extremities] : moves all extremities [Alert and Oriented] : alert and oriented [de-identified] : + II/VI systolic ejection murmur  [de-identified] : 2+ bilateral LE edema non pitting edema

## 2025-03-18 NOTE — PHYSICAL EXAM
[No Acute Distress] : no acute distress [Normal Conjunctiva] : normal conjunctiva [No Carotid Bruit] : no carotid bruit [Clear Lung Fields] : clear lung fields [Good Air Entry] : good air entry [Soft] : abdomen soft [Non Tender] : non-tender [Normal Gait] : normal gait [No Rash] : no rash [Moves all extremities] : moves all extremities [Alert and Oriented] : alert and oriented [de-identified] : + II/VI systolic ejection murmur  [de-identified] : 2+ bilateral LE edema non pitting edema

## 2025-03-18 NOTE — HISTORY OF PRESENT ILLNESS
[FreeTextEntry1] : Referred by Dr. Ramos  77 year old female with a PMH of asthma, OA, fatty liver, chronic dizziness, HTN, HLD, T2DM, Stage 3 CKD (baseline Cr 1.5-1.9), chronic anemia (baseline Hg ~10), atrial fibrillation s/p DCCV on 9/30/24 (on Xarelto), bifasicular block (LAFB and RBBB), non-obstructive CAD and chronic diastolic heart failure with severe low flow, low gradient aortic stenosis s/p transfemoral TAVR with Tawny Ultra (23mm) on 12/20/24 c/b CHB followed by dual-chamber Medtronic PPM on 12/23/24 who presents for one month follow up.   The patient was admitted to [facility] on 12/2 for heart failure optimization after being seen in clinic with marked volume overload and symptoms of ongoing dyspnea and orthopnea. Over a week, the patient underwent careful diuresis in the setting of fluctuating Cr. After adequate diuresis, the patient was discharged home with a plan to return for TAVR. Renal recommended discharge home on Bumex 1mg PRN and Coreg 6.25 mg BID (1/2 home dose).   Prior to her planned admission for TAVR, the patient was found by her home nurse to be bradycardic in the 40's associated with dizziness and was sent to [facility] ED on 12/19.  Given the persistent bradycardia and a history of bifascicular block with QRS of 154, EP was consulted for likely pacemaker implantation after TAVR. The patient underwent TAVR on 12/20/24 without complications but went into complete heart block. TVP via RIJ was maintained in placed until 12/23 when patient underwent uncomplicated dual chamber Medtronic pacemaker. She was deemed stable for discharge home on 12/24/24. ECHO done prior to discharge showed the following:  ECHO, 12/24/24: Normal left ventricular size and systolic function. Mild symmetric left ventricular hypertrophy. Grade II left ventricular diastolic dysfunction. Normal right ventricular size and systolic function. Device leads are noted in the right heart. Biatrial enlargement. 23 mm Tawny 3 Ultra valve is noted in the aortic position without any aortic regurgitation. The peak transvalvular velocity is 2.41 m/s, the mean transvalvular gradient is 12.00 mmHg, and the LVOT/AV velocity ratio is 0.53. The peak transaortic gradient is 23.23 mmHg. Anterior mitral leaflet is likely mildly prolapsed at A2 scallop, not well visualized. Mild eccentric mitral regurgitation. No pericardial effusion.  Since her discharge appointment, the patient increased her Coreg to 12.5 mg PO BID and followed up with EP who monitored her PPM function and plans to follow up in 6 months.    Today, the patient reports she is feeling well, denies any SOB at rest or with exertion, chest pain, palpitations, or dizziness. She does admit to increased LE edema.  She also admits to eating a lot of salt and elevated BP. The patient has been working with her cardiologist for better BP management.   The patient is scheduled for an ECHO today.

## 2025-03-18 NOTE — REVIEW OF SYSTEMS
[Feeling Fatigued] : feeling fatigued [Negative] : Neurological [Lower Ext Edema] : lower extremity edema [Fever] : no fever [Headache] : no headache [Chills] : no chills [SOB] : no shortness of breath [Dyspnea on exertion] : not dyspnea during exertion [Chest Discomfort] : no chest discomfort [Leg Claudication] : no intermittent leg claudication [Palpitations] : no palpitations [Orthopnea] : no orthopnea [PND] : no PND [Syncope] : no syncope [FreeTextEntry6] : dyspnea

## 2025-03-18 NOTE — PHYSICAL EXAM
[No Acute Distress] : no acute distress [Normal Conjunctiva] : normal conjunctiva [No Carotid Bruit] : no carotid bruit [Clear Lung Fields] : clear lung fields [Good Air Entry] : good air entry [Soft] : abdomen soft [Non Tender] : non-tender [Normal Gait] : normal gait [No Rash] : no rash [Moves all extremities] : moves all extremities [Alert and Oriented] : alert and oriented [de-identified] : + II/VI systolic ejection murmur  [de-identified] : 2+ bilateral LE edema non pitting edema

## 2025-03-18 NOTE — HISTORY OF PRESENT ILLNESS
[FreeTextEntry1] : Referred by Dr. Ramos  77 year old female with a PMH of asthma, OA, fatty liver, chronic dizziness, HTN, HLD, T2DM, Stage 3 CKD (baseline Cr 1.5-1.9), chronic anemia (baseline Hg ~10), atrial fibrillation s/p DCCV on 9/30/24 (on Xarelto), bifasicular block (LAFB and RBBB), non-obstructive CAD and chronic diastolic heart failure with severe low flow, low gradient aortic stenosis s/p transfemoral TAVR with Tawny Ultra (23mm) on 12/20/24 c/b CHB followed by dual-chamber Medtronic PPM on 12/23/24 who presents for one month follow up.   The patient was admitted to [facility] on 12/2 for heart failure optimization after being seen in clinic with marked volume overload and symptoms of ongoing dyspnea and orthopnea. Over a week, the patient underwent careful diuresis in the setting of fluctuating Cr. After adequate diuresis, the patient was discharged home with a plan to return for TAVR. Renal recommended discharge home on Bumex 1mg PRN and Coreg 6.25 mg BID (1/2 home dose).   Prior to her planned admission for TAVR, the patient was found by her home nurse to be bradycardic in the 40's associated with dizziness and was sent to [facility] ED on 12/19.  Given the persistent bradycardia and a history of bifascicular block with QRS of 154, EP was consulted for likely pacemaker implantation after TAVR. The patient underwent TAVR on 12/20/24 without complications but went into complete heart block. TVP via RIJ was maintained in placed until 12/23 when patient underwent uncomplicated dual chamber Medtronic pacemaker. She was deemed stable for discharge home on 12/24/24. ECHO done prior to discharge showed the following:  ECHO, 12/24/24: Normal left ventricular size and systolic function. Mild symmetric left ventricular hypertrophy. Grade II left ventricular diastolic dysfunction. Normal right ventricular size and systolic function. Device leads are noted in the right heart. Biatrial enlargement. 23 mm Tawny 3 Ultra valve is noted in the aortic position without any aortic regurgitation. The peak transvalvular velocity is 2.41 m/s, the mean transvalvular gradient is 12.00 mmHg, and the LVOT/AV velocity ratio is 0.53. The peak transaortic gradient is 23.23 mmHg. Anterior mitral leaflet is likely mildly prolapsed at A2 scallop, not well visualized. Mild eccentric mitral regurgitation. No pericardial effusion.  Since her discharge appointment, the patient increased her Coreg to 12.5 mg PO BID and followed up with EP who monitored her PPM function and plans to follow up in 6 months.    Today, the patient reports she is feeling well, denies any SOB at rest or with exertion, chest pain, palpitations, or dizziness. She does admit to increased LE edema.  She also admits to eating a lot of salt and elevated BP. The patient has been working with her cardiologist for better BP management.   The patient is scheduled for an ECHO today.

## 2025-03-18 NOTE — PLAN
[TextEntry] : 1) Will return to [facility] clinic in 6 months with ECHO  2) Fluid/salt restriction, heart failure education 3) Better BP/diuretic management with primary cardiologist 4) Continue cardiology care with Dr. Ramos

## 2025-03-18 NOTE — PHYSICAL EXAM
[No Acute Distress] : no acute distress [Normal Conjunctiva] : normal conjunctiva [No Carotid Bruit] : no carotid bruit [Clear Lung Fields] : clear lung fields [Good Air Entry] : good air entry [Soft] : abdomen soft [Non Tender] : non-tender [Normal Gait] : normal gait [No Rash] : no rash [Moves all extremities] : moves all extremities [Alert and Oriented] : alert and oriented [de-identified] : + II/VI systolic ejection murmur  [de-identified] : 2+ bilateral LE edema non pitting edema

## 2025-03-18 NOTE — ASSESSMENT
[FreeTextEntry1] : 77 year old female with a PMH of asthma, OA, fatty liver, chronic dizziness, HTN, HLD, T2DM, Stage 3 CKD (baseline Cr 1.5-1.9), chronic anemia (baseline Hg ~10), atrial fibrillation s/p DCCV on 9/30/24 (on Xarelto), bifasicular block (LAFB and RBBB), non-obstructive CAD and chronic diastolic heart failure with severe low flow, low gradient aortic stenosis s/p transfemoral TAVR with Tawny Ultra (23mm) on 12/20/24 c/b CHB followed by dual-chamber Medtronic PPM on 12/23/24 who presents for one month follow up.   The patient is clinically stable, NYHA Class II. The ECHO was reviewed and independently interpreted by Dr. Perdomo which showed the Tawny valve in the aortic position functioning well with no AI, mean gradient 21 mmHg (slightly elevated from 12 mmHg), the results were discussed with the patient and her son in law.  Given that the patient feels well, no need for further intervention at this time.  Patient reeducated on the importance of salt & fluid restriction. She has a follow up with Dr. Ramos this Thursday to discuss BP (still elevated with medications adjusted) and diuretic management. Will have labs with Dr. Ramos this week The patient will return to D clinic in six months with ECHO. All questions answered.

## 2025-03-18 NOTE — HISTORY OF PRESENT ILLNESS
[FreeTextEntry1] : Referred by Dr. Ramos  77 year old female with a PMH of asthma, OA, fatty liver, chronic dizziness, HTN, HLD, T2DM, Stage 3 CKD (baseline Cr 1.5-1.9), chronic anemia (baseline Hg ~10), atrial fibrillation s/p DCCV on 9/30/24 (on Xarelto), bifasicular block (LAFB and RBBB), non-obstructive CAD and chronic diastolic heart failure with severe low flow, low gradient aortic stenosis s/p transfemoral TAVR with Tawny Ultra (23mm) on 12/20/24 c/b CHB followed by dual-chamber Medtronic PPM on 12/23/24 who presents for one month follow up.   The patient was admitted to [facility] on 12/2 for heart failure optimization after being seen in clinic with marked volume overload and symptoms of ongoing dyspnea and orthopnea. Over a week, the patient underwent careful diuresis in the setting of fluctuating Cr. After adequate diuresis, the patient was discharged home with a plan to return for TAVR. Renal recommended discharge home on Bumex 1mg PRN and Coreg 6.25 mg BID (1/2 home dose).   Prior to her planned admission for TAVR, the patient was found by her home nurse to be bradycardic in the 40's associated with dizziness and was sent to [facility] ED on 12/19.  Given the persistent bradycardia and a history of bifascicular block with QRS of 154, EP was consulted for likely pacemaker implantation after TAVR. The patient underwent TAVR on 12/20/24 without complications but went into complete heart block. TVP via RIJ was maintained in placed until 12/23 when patient underwent uncomplicated dual chamber Medtronic pacemaker. She was deemed stable for discharge home on 12/24/24. ECHO done prior to discharge showed the following:  ECHO, 12/24/24: Normal left ventricular size and systolic function. Mild symmetric left ventricular hypertrophy. Grade II left ventricular diastolic dysfunction. Normal right ventricular size and systolic function. Device leads are noted in the right heart. Biatrial enlargement. 23 mm Tawny 3 Ultra valve is noted in the aortic position without any aortic regurgitation. The peak transvalvular velocity is 2.41 m/s, the mean transvalvular gradient is 12.00 mmHg, and the LVOT/AV velocity ratio is 0.53. The peak transaortic gradient is 23.23 mmHg. Anterior mitral leaflet is likely mildly prolapsed at A2 scallop, not well visualized. Mild eccentric mitral regurgitation. No pericardial effusion.  Since her discharge appointment, the patient increased her Coreg to 12.5 mg PO BID and followed up with EP who monitored her PPM function and plans to follow up in 6 months.    Today, the patient reports she is feeling well, denies any SOB at rest or with exertion, chest pain, palpitations, or dizziness. She does admit to increased LE edema.  She also admits to eating a lot of salt and elevated BP. The patient has been working with her cardiologist for better BP management.   The patient is scheduled for an ECHO today.

## 2025-04-22 NOTE — PHYSICAL EXAM
[General Appearance - Alert] : alert [General Appearance - In No Acute Distress] : in no acute distress [Auscultation Breath Sounds / Voice Sounds] : lungs were clear to auscultation bilaterally [Heart Rate And Rhythm] : heart rate was normal and rhythm regular [Heart Sounds] : normal S1 and S2 [Heart Sounds Gallop] : no gallops [Heart Sounds Pericardial Friction Rub] : no pericardial rub [Full Pulse] : the pedal pulses are present [Bowel Sounds] : normal bowel sounds [Abdomen Soft] : soft [Abdomen Tenderness] : non-tender [] : no hepato-splenomegaly [Abdomen Mass (___ Cm)] : no abdominal mass palpated [Abnormal Walk] : normal gait [Nail Clubbing] : no clubbing  or cyanosis of the fingernails [Musculoskeletal - Swelling] : no joint swelling seen [Motor Tone] : muscle strength and tone were normal [Oriented To Time, Place, And Person] : oriented to person, place, and time [Impaired Insight] : insight and judgment were intact [Affect] : the affect was normal [FreeTextEntry1] : + BL EDEMA

## 2025-04-22 NOTE — PLAN
[TextEntry] : BP/HR taken in different positions (sitting, standing, and lying down) and d/w pt. Self-monitoring at home advised i.e. BP, FS, etc. Medications updated. Diet/healthy lifestyle counseling. New labs ordered.

## 2025-04-22 NOTE — HISTORY OF PRESENT ILLNESS
[FreeTextEntry1] : 78 y/o F with PMHX of  HTN, HLD, DM, CKD3 (baseline Cr 1.5-1.9), chronic anemia, AFIB s/p DCCV on 9/30/24 (on Xarelto), bifascicular block (LAFB and RBBB), non-obstructive CAD, HFpEF, moderate to severe MR, asthma, OA, fallty liver, and chronic dizziness is here for a follow up visit.  Last seen: Feb 25, 2025  PT's son in law reports that the PT's calf veins have not been draining properly. PT needs an injection in October for it.   PT was seen by [facility]/Dr. Perdomo on Mar 17, 2025, where she did an ECHO.  - did not see any issues in the ECHO   Had a period of time when she had salt in her diet. Has stopped.  PT was 210 lbs last night.

## 2025-04-22 NOTE — END OF VISIT
[TextEntry] : All medical record entries have been made by the scribe, DEBO GREEN, at Dr. Luis Bishop's direction and personally dictated by me on 4/22/2025. I have received the chart and agree that the record accurately reflects my personal performance of the history, physical exam, assessment, and plan, I have also personally directed, reviewed, and agreed with the chart.

## 2025-04-22 NOTE — ASSESSMENT
[FreeTextEntry1] : * 78 y/o F with PMHX of  HTN, HLD, DM, CKD3 (baseline Cr 1.5-1.9), chronic anemia, AFIB s/p DCCV on 9/30/24 (on Xarelto), bifascicular block (LAFB and RBBB), non-obstructive CAD, HFpEF, moderate to severe MR, asthma, OA, fallty liver, and chronic dizziness is here for a follow up visit.  Collected new bloodwork.  B/L legs are swollen. Positive murmurs, S3, and S4.   PT was sent to the [facility] ER for fluid drainage.  Will meet with GIOVANNI Terrazas.

## 2025-05-08 NOTE — PHYSICAL EXAM
[No Acute Distress] : no acute distress [No Carotid Bruit] : no carotid bruit [Clear Lung Fields] : clear lung fields [Soft] : abdomen soft [Non Tender] : non-tender [Normal Gait] : normal gait [No Rash] : no rash [Moves all extremities] : moves all extremities [Alert and Oriented] : alert and oriented [de-identified] : irregular, + systolic ejection murmur at apex  [de-identified] : trace edema bilaterally

## 2025-05-08 NOTE — ASSESSMENT
[FreeTextEntry1] : 77F with PMH of asthma, OA, fatty liver, chronic dizziness, HTN, HLD, T2DM, Stage 3 CKD (baseline Cr 1.5-1.9), chronic anemia (baseline Hg ~10), atrial fibrillation s/p DCCV on 9/30/24 (on Xarelto), bifasicular block (LAFB and RBBB), non-obstructive CAD, HFpEF, severe low flow, low gradient aortic stenosis s/p transfemoral TAVR with Tawny Ultra (23mm) on 12/20/24 course was notable for CHB s/p PPM (Medtronic, 12/23/24) with recent hospitalization for ADHF found to have moderate to severe MR on TTE/MARCY, now presenting for post hospital discharge follow up.   The patient is clinically stable, NYHA Class III. ECHO/MARCY were reviewed and independently interpreted by Dr. Perdomo which showed severe mitral regurgitation and patient's anatomy is amenable to mTEER (Rebecca), this discussed with the patient and her son in law.  The mTEER procedure, including risks and benefits, was discussed with the patient and she agrees to proceed. All questions answered.

## 2025-05-08 NOTE — HISTORY OF PRESENT ILLNESS
[FreeTextEntry1] : Referred by Dr. Ramos Nephrologist: Dr. Bishop  Patient's son in law, Milligan present at follow up visit who helped with  services.   77F with PMH of asthma, OA, fatty liver, chronic dizziness, HTN, HLD, T2DM, Stage 3 CKD (baseline Cr 1.5-1.9), chronic anemia (baseline Hg ~10), atrial fibrillation s/p DCCV on 9/30/24 (on Xarelto), bifasicular block (LAFB and RBBB), non-obstructive CAD, HFpEF, severe low flow, low gradient aortic stenosis s/p transfemoral TAVR with Tawny Ultra (23mm) on 12/20/24 course was notable for CHB s/p PPM (Medtronic, 12/23/24) with recent hospitalization for ADHF found to have moderate to severe MR on TTE/MARCY, now presenting for post hospital discharge follow up.    Discharge Summary [facility] Admission from 4/22 -4/27/25 The presented to [facility] ED on 4/22/25 as advised by Dr. Bishop d/t 2-week history of 20lb weight gain, worsening LE edema, and progressing RODRIGUEZ over the past 2 weeks. Patient states that she becomes short of breath when ambulating around her home that has been progressively worsening, occasionally associated with dizziness. Denies any palpitations chest pain, syncope, n/v/ fever, chills. Is compliant with medications.  Patient initially diuresed with Lasix IV 40mg BID. She was then transitioned to Lasix 40mg PO BID and will be discharged on home Bumex 1mg QD. She was optimized on GDMT including Entresto 24-26mg BID, Coreg 12.5mg BID, Spironolactone 25mg QD and Farxiga 10mg QD.   Discharge Weight: 89.3kg  Discharge Cr: 1.82  Structural heart team consulted given recent TAVR 12/24 and ongoing volume overload. Repeat TTE 4/23: LVSF normal with EF 67%, no RWMA, grade III DD, mildly enlarged RV cavity, LA mod dilated, RA mod dilated, peak transaortic velocity 3.46, peak transaortic gradient 47.9 mmHg, PASP 53 mmHg, trace pericardial effusion. MARCY (4/25/2025) revealing moderate-severe MR, good position of Tawny Valve with no intra-valvular or paravalvular regurgitation. Symptoms are likely from mitral regurgitation. No acute intervention this admission, patient can follow-up as outpatient for mitral valve intervention assessment with Dr. Perdomo within one week of discharge.   MARCY 4/25/25 CONCLUSIONS:  1. Left ventricular systolic function is normal.  2. Normal right ventricular cavity size and mildly reduced right ventricular systolic function.  3. Moderate to severe mitral regurgitation. Mechanism of mitral regurgitation appears to be mixed- mild prolapse of the tip of the central anterior scallop and mild tethering of the tip of the central posterior scallop.  4. A TAWNY 3 Ultra RESILIA (TAVR) valve replacement is present in the aortic position The prosthetic valve has normal leaflet excursion and is well seated with normal function. No intravalvular regurgitation No paravalvular regurgitation.  5. Mild pulmonary hypertension.  /25 CONCLUSIONS:  1. Mild left ventricular hypertrophy.  2. Left ventricular systolic function is normal with an ejection fraction of 67 % by Chahal's method of disks. There are no regional wall motion abnormalities seen.  3. There is severe (grade 3) left ventricular diastolic dysfunction, with elevated left ventricular filling pressure.  4. Mildly enlarged right ventricular cavity size and normal right ventricular systolic function.  5. Left atrium is moderately dilated.  6. Right atrium is moderately dilated.  7. 23 mm TAWNY 3 Ultra (TAVR) is present in the aortic position with no evidence of aortic regurgitation. Peak transaortic velocity is 3.46 m/s, peak transaortic gradient is 47.9 mmHg and mean transaortic gradient is 27.0 mmHg with an LVOT/aortic valve VTI ratio of 0.46. Aortic valve area is estimated at 1.28 cm by the continuity equation and indexed at 0.63 cm/m.  8. Estimated pulmonary artery systolic pressure is 53 mmHg, consistent with moderate pulmonary hypertension.  9. Trace pericardial effusion. 10. Compared to the transthoracic echocardiogram performed on 3/17/2025 and immediate post-TAVR TTE 12/21/2024, transaortic gradients are similarly elevated.  Discharge Labs 4/27/25 WBC 5.01 H/H 10.6/33.4 Plt 112 BUN/Cr 38/1.82   Discharge Medications: - Amiodarone 200mg QD - Coreg 12.5mg BID - Bumex 1mg QD  - Entresto 24-26mg BID - Spironolactone 25mg QD  - Buspirone 5mg PO QD - Xarelto 15mg QD  - Atorvastatin 40mg QD  - Farxiga 10mg QD  - Pantoprazole 40mg QD - Pioglitazone 45mg QD - Fluticasone-salmeterol 100-50mcg    Home Weight Trend: 194 lbs Home BP trend: SBP 90s- 120s    Today, patient reports feeling much better. Her breathing has improved, however still gets SOB with walking a few blocks, NYHA Class III.  Patient does admit to LE edema also improved at discharge.  She denies any SOB at rest, chest pain, palpitations, fever or chills.  She is much better with watching her salt/fluid intake.    mTEER work up check list [x] TTE [x] MARCY [X ] CV surgical evaluation- seen by Dr. Mancilla in the hospital on 04/25/2025  [x] coronary assessment - CCTA 12/12/24; cardiac cath 7/10/24 [x] Carotid US 12/3/24 [X] KCCQ [x] EKG [ ] pre-procedure labs-  
[FreeTextEntry1] : Referred by Dr. Ramos Nephrologist: Dr. Bishop  Patient's son in law, Milligan present at follow up visit who helped with  services.   77F with PMH of asthma, OA, fatty liver, chronic dizziness, HTN, HLD, T2DM, Stage 3 CKD (baseline Cr 1.5-1.9), chronic anemia (baseline Hg ~10), atrial fibrillation s/p DCCV on 9/30/24 (on Xarelto), bifasicular block (LAFB and RBBB), non-obstructive CAD, HFpEF, severe low flow, low gradient aortic stenosis s/p transfemoral TAVR with Tawny Ultra (23mm) on 12/20/24 course was notable for CHB s/p PPM (Medtronic, 12/23/24) with recent hospitalization for ADHF found to have moderate to severe MR on TTE/MARCY, now presenting for post hospital discharge follow up.    Discharge Summary [facility] Admission from 4/22 -4/27/25 The presented to [facility] ED on 4/22/25 as advised by Dr. Bishop d/t 2-week history of 20lb weight gain, worsening LE edema, and progressing RODRIGUEZ over the past 2 weeks. Patient states that she becomes short of breath when ambulating around her home that has been progressively worsening, occasionally associated with dizziness. Denies any palpitations chest pain, syncope, n/v/ fever, chills. Is compliant with medications.  Patient initially diuresed with Lasix IV 40mg BID. She was then transitioned to Lasix 40mg PO BID and will be discharged on home Bumex 1mg QD. She was optimized on GDMT including Entresto 24-26mg BID, Coreg 12.5mg BID, Spironolactone 25mg QD and Farxiga 10mg QD.   Discharge Weight: 89.3kg  Discharge Cr: 1.82  Structural heart team consulted given recent TAVR 12/24 and ongoing volume overload. Repeat TTE 4/23: LVSF normal with EF 67%, no RWMA, grade III DD, mildly enlarged RV cavity, LA mod dilated, RA mod dilated, peak transaortic velocity 3.46, peak transaortic gradient 47.9 mmHg, PASP 53 mmHg, trace pericardial effusion. MARCY (4/25/2025) revealing moderate-severe MR, good position of Tawny Valve with no intra-valvular or paravalvular regurgitation. Symptoms are likely from mitral regurgitation. No acute intervention this admission, patient can follow-up as outpatient for mitral valve intervention assessment with Dr. Perdomo within one week of discharge.   MARCY 4/25/25 CONCLUSIONS:  1. Left ventricular systolic function is normal.  2. Normal right ventricular cavity size and mildly reduced right ventricular systolic function.  3. Moderate to severe mitral regurgitation. Mechanism of mitral regurgitation appears to be mixed- mild prolapse of the tip of the central anterior scallop and mild tethering of the tip of the central posterior scallop.  4. A TAWNY 3 Ultra RESILIA (TAVR) valve replacement is present in the aortic position The prosthetic valve has normal leaflet excursion and is well seated with normal function. No intravalvular regurgitation No paravalvular regurgitation.  5. Mild pulmonary hypertension.  /25 CONCLUSIONS:  1. Mild left ventricular hypertrophy.  2. Left ventricular systolic function is normal with an ejection fraction of 67 % by Chahal's method of disks. There are no regional wall motion abnormalities seen.  3. There is severe (grade 3) left ventricular diastolic dysfunction, with elevated left ventricular filling pressure.  4. Mildly enlarged right ventricular cavity size and normal right ventricular systolic function.  5. Left atrium is moderately dilated.  6. Right atrium is moderately dilated.  7. 23 mm TAWNY 3 Ultra (TAVR) is present in the aortic position with no evidence of aortic regurgitation. Peak transaortic velocity is 3.46 m/s, peak transaortic gradient is 47.9 mmHg and mean transaortic gradient is 27.0 mmHg with an LVOT/aortic valve VTI ratio of 0.46. Aortic valve area is estimated at 1.28 cm by the continuity equation and indexed at 0.63 cm/m.  8. Estimated pulmonary artery systolic pressure is 53 mmHg, consistent with moderate pulmonary hypertension.  9. Trace pericardial effusion. 10. Compared to the transthoracic echocardiogram performed on 3/17/2025 and immediate post-TAVR TTE 12/21/2024, transaortic gradients are similarly elevated.  Discharge Labs 4/27/25 WBC 5.01 H/H 10.6/33.4 Plt 112 BUN/Cr 38/1.82   Discharge Medications: - Amiodarone 200mg QD - Coreg 12.5mg BID - Bumex 1mg QD  - Entresto 24-26mg BID - Spironolactone 25mg QD  - Buspirone 5mg PO QD - Xarelto 15mg QD  - Atorvastatin 40mg QD  - Farxiga 10mg QD  - Pantoprazole 40mg QD - Pioglitazone 45mg QD - Fluticasone-salmeterol 100-50mcg    Home Weight Trend: 194 lbs Home BP trend: SBP 90s- 120s    Today, patient reports feeling much better. Her breathing has improved, however still gets SOB with walking a few blocks, NYHA Class III.  Patient does admit to LE edema also improved at discharge.  She denies any SOB at rest, chest pain, palpitations, fever or chills.  She is much better with watching her salt/fluid intake.    mTEER work up check list [x] TTE [x] MARCY [X ] CV surgical evaluation- seen by Dr. Mancilla in the hospital on 04/25/2025  [x] coronary assessment - CCTA 12/12/24; cardiac cath 7/10/24 [x] Carotid US 12/3/24 [X] KCCQ [x] EKG [ ] pre-procedure labs-  
[FreeTextEntry1] : Referred by Dr. Ramos Nephrologist: Dr. Bishop  Patient's son in law, Milligan present at follow up visit who helped with  services.   77F with PMH of asthma, OA, fatty liver, chronic dizziness, HTN, HLD, T2DM, Stage 3 CKD (baseline Cr 1.5-1.9), chronic anemia (baseline Hg ~10), atrial fibrillation s/p DCCV on 9/30/24 (on Xarelto), bifasicular block (LAFB and RBBB), non-obstructive CAD, HFpEF, severe low flow, low gradient aortic stenosis s/p transfemoral TAVR with Tawny Ultra (23mm) on 12/20/24 course was notable for CHB s/p PPM (Medtronic, 12/23/24) with recent hospitalization for ADHF found to have moderate to severe MR on TTE/MARCY, now presenting for post hospital discharge follow up.    Discharge Summary [facility] Admission from 4/22 -4/27/25 The presented to [facility] ED on 4/22/25 as advised by Dr. Bishop d/t 2-week history of 20lb weight gain, worsening LE edema, and progressing RODRIGUEZ over the past 2 weeks. Patient states that she becomes short of breath when ambulating around her home that has been progressively worsening, occasionally associated with dizziness. Denies any palpitations chest pain, syncope, n/v/ fever, chills. Is compliant with medications.  Patient initially diuresed with Lasix IV 40mg BID. She was then transitioned to Lasix 40mg PO BID and will be discharged on home Bumex 1mg QD. She was optimized on GDMT including Entresto 24-26mg BID, Coreg 12.5mg BID, Spironolactone 25mg QD and Farxiga 10mg QD.   Discharge Weight: 89.3kg  Discharge Cr: 1.82  Structural heart team consulted given recent TAVR 12/24 and ongoing volume overload. Repeat TTE 4/23: LVSF normal with EF 67%, no RWMA, grade III DD, mildly enlarged RV cavity, LA mod dilated, RA mod dilated, peak transaortic velocity 3.46, peak transaortic gradient 47.9 mmHg, PASP 53 mmHg, trace pericardial effusion. MARCY (4/25/2025) revealing moderate-severe MR, good position of Tawny Valve with no intra-valvular or paravalvular regurgitation. Symptoms are likely from mitral regurgitation. No acute intervention this admission, patient can follow-up as outpatient for mitral valve intervention assessment with Dr. Perdomo within one week of discharge.   MARCY 4/25/25 CONCLUSIONS:  1. Left ventricular systolic function is normal.  2. Normal right ventricular cavity size and mildly reduced right ventricular systolic function.  3. Moderate to severe mitral regurgitation. Mechanism of mitral regurgitation appears to be mixed- mild prolapse of the tip of the central anterior scallop and mild tethering of the tip of the central posterior scallop.  4. A TAWNY 3 Ultra RESILIA (TAVR) valve replacement is present in the aortic position The prosthetic valve has normal leaflet excursion and is well seated with normal function. No intravalvular regurgitation No paravalvular regurgitation.  5. Mild pulmonary hypertension.  /25 CONCLUSIONS:  1. Mild left ventricular hypertrophy.  2. Left ventricular systolic function is normal with an ejection fraction of 67 % by Chahal's method of disks. There are no regional wall motion abnormalities seen.  3. There is severe (grade 3) left ventricular diastolic dysfunction, with elevated left ventricular filling pressure.  4. Mildly enlarged right ventricular cavity size and normal right ventricular systolic function.  5. Left atrium is moderately dilated.  6. Right atrium is moderately dilated.  7. 23 mm TAWNY 3 Ultra (TAVR) is present in the aortic position with no evidence of aortic regurgitation. Peak transaortic velocity is 3.46 m/s, peak transaortic gradient is 47.9 mmHg and mean transaortic gradient is 27.0 mmHg with an LVOT/aortic valve VTI ratio of 0.46. Aortic valve area is estimated at 1.28 cm by the continuity equation and indexed at 0.63 cm/m.  8. Estimated pulmonary artery systolic pressure is 53 mmHg, consistent with moderate pulmonary hypertension.  9. Trace pericardial effusion. 10. Compared to the transthoracic echocardiogram performed on 3/17/2025 and immediate post-TAVR TTE 12/21/2024, transaortic gradients are similarly elevated.  Discharge Labs 4/27/25 WBC 5.01 H/H 10.6/33.4 Plt 112 BUN/Cr 38/1.82   Discharge Medications: - Amiodarone 200mg QD - Coreg 12.5mg BID - Bumex 1mg QD  - Entresto 24-26mg BID - Spironolactone 25mg QD  - Buspirone 5mg PO QD - Xarelto 15mg QD  - Atorvastatin 40mg QD  - Farxiga 10mg QD  - Pantoprazole 40mg QD - Pioglitazone 45mg QD - Fluticasone-salmeterol 100-50mcg    Home Weight Trend: 194 lbs Home BP trend: SBP 90s- 120s    Today, patient reports feeling much better. Her breathing has improved, however still gets SOB with walking a few blocks, NYHA Class III.  Patient does admit to LE edema also improved at discharge.  She denies any SOB at rest, chest pain, palpitations, fever or chills.  She is much better with watching her salt/fluid intake.    mTEER work up check list [x] TTE [x] MARCY [X ] CV surgical evaluation- seen by Dr. Mancilla in the hospital on 04/25/2025  [x] coronary assessment - CCTA 12/12/24; cardiac cath 7/10/24 [x] Carotid US 12/3/24 [X] KCCQ [x] EKG [ ] pre-procedure labs-  
no

## 2025-05-08 NOTE — RESULTS/DATA
[TextEntry] : ECHO, 12/24/24: Normal left ventricular size and systolic function. Mild symmetric left ventricular hypertrophy. Grade II left ventricular diastolic dysfunction. Normal right ventricular size and systolic function. Device leads are noted in the right heart. Biatrial enlargement. 23 mm Tawny 3 Ultra valve is noted in the aortic position without any aortic regurgitation. The peak transvalvular velocity is 2.41 m/s, the mean transvalvular gradient is 12.00 mmHg, and the LVOT/AV velocity ratio is 0.53. The peak transaortic gradient is 23.23 mmHg. Anterior mitral leaflet is likely mildly prolapsed at A2 scallop, not well visualized. Mild eccentric mitral regurgitation. No pericardial effusion.  carotid US 12/3/24 IMPRESSION: 1. No significant hemodynamic stenosis of either carotid artery. 2. Tardus parvus waveforms within the internal carotid arteries bilaterally suggestive of aortic stenosis.

## 2025-05-08 NOTE — PHYSICAL EXAM
[No Acute Distress] : no acute distress [No Carotid Bruit] : no carotid bruit [Clear Lung Fields] : clear lung fields [Soft] : abdomen soft [Non Tender] : non-tender [Normal Gait] : normal gait [No Rash] : no rash [Moves all extremities] : moves all extremities [Alert and Oriented] : alert and oriented [de-identified] : irregular, + systolic ejection murmur at apex  [de-identified] : trace edema bilaterally

## 2025-05-08 NOTE — PHYSICAL EXAM
[No Acute Distress] : no acute distress [No Carotid Bruit] : no carotid bruit [Clear Lung Fields] : clear lung fields [Soft] : abdomen soft [Non Tender] : non-tender [Normal Gait] : normal gait [No Rash] : no rash [Moves all extremities] : moves all extremities [Alert and Oriented] : alert and oriented [de-identified] : irregular, + systolic ejection murmur at apex  [de-identified] : trace edema bilaterally

## 2025-05-08 NOTE — PLAN
Call and advise patient that the results showed severe constipation. Advised patient to begin taking MiraLAX daily.
[TextEntry] : 1) Will schedule mTEER 2) Follow up with nephrology as scheduled 3) Continue current medication regimen and cardiology care with Dr. Rachel FINLEY, Dr. Chucho Perdomo, personally performed the evaluation and management (E/M) services for this established patient who presents today with (a) new problem(s)/exacerbation of (an) existing condition(s). That E/M includes conducting the clinically appropriate interval history &/or exam, assessing all new/exacerbated conditions, and establishing a new plan of care. Today, my MAMADOU, noted herewith, was here to observe my evaluation and management service for this new problem/exacerbated condition and follow the plan of care established by me going forward.  

## 2025-05-08 NOTE — REVIEW OF SYSTEMS
[Feeling Fatigued] : feeling fatigued [Lower Ext Edema] : lower extremity edema [Negative] : Neurological [Fever] : no fever [Headache] : no headache [Chills] : no chills [SOB] : no shortness of breath [Dyspnea on exertion] : not dyspnea during exertion [Chest Discomfort] : no chest discomfort [Leg Claudication] : no intermittent leg claudication [Palpitations] : no palpitations [Orthopnea] : no orthopnea [PND] : no PND [Syncope] : no syncope [FreeTextEntry6] : dyspnea

## 2025-05-15 NOTE — PROCEDURE
[No] : not [NSR] : normal sinus rhythm [Pacemaker] : pacemaker [Threshold Testing Performed] : Threshold testing was performed [None] : none [de-identified] : [employer]tr[employer] [de-identified] : AAI<->DDD [de-identified] :  [de-identified] : 12.2 years [de-identified] : see man 5/8/25 AP 21/5%  23/5% 66% afib - all rate controlled

## 2025-05-15 NOTE — DISCUSSION/SUMMARY
[FreeTextEntry1] :  77 year old female with asthma, HTN, HLD, DM2, CKD3, chronic anemia, AFib s/p DCCV 09/2024  on Xarelto, HFpEF, moderate to severe MR, severe MR s/p TAVR, sinus bradycardia s/p pacemaker, who presents for follow up.  Device interrogation reveals normal function and all measured data is within normal limits.  She has paroxysmal atrial fibrillation and is overall rate controlled.  She is on amiodarone and oral anticoagulation.  We discussed that after she has her mitral valve fixed this will hopefully help her arrhythmia and we would not make any changes until then.  She overall feels well.  follow up after her mitral valve procedure.  She knows to call with any quesitons or concerns.

## 2025-05-15 NOTE — REVIEW OF SYSTEMS
[Fever] : no fever [Weight Gain (___ Lbs)] : no recent weight gain [Chills] : no chills [Weight Loss (___ Lbs)] : no recent weight loss [Chest Discomfort] : no chest discomfort [Lower Ext Edema] : no extremity edema [Palpitations] : palpitations [Orthopnea] : no orthopnea [Syncope] : no syncope [Negative] : Heme/Lymph

## 2025-05-15 NOTE — HISTORY OF PRESENT ILLNESS
[de-identified] :  77 year old female with asthma, HTN, HLD, DM2, CKD3, chronic anemia, AFib s/p DCCV 09/2024  on Xarelto, HFpEF, moderate to severe MR, severe MR s/p TAVR, sinus bradycardia s/p pacemaker, who presents for follow up.  She presents for routine follow up.  No device related complaints.  She notes intermittent palpitations.  They are not rapid but she notes an irregular heart beat.  She has an upcoming procedure with Dr. Perdomo for her mitral valve.   She is on amio and oral anticoagulation. No chest pain, edema, orthopnea or syncope.

## 2025-05-15 NOTE — HISTORY OF PRESENT ILLNESS
[de-identified] :  77 year old female with asthma, HTN, HLD, DM2, CKD3, chronic anemia, AFib s/p DCCV 09/2024  on Xarelto, HFpEF, moderate to severe MR, severe MR s/p TAVR, sinus bradycardia s/p pacemaker, who presents for follow up.  She presents for routine follow up.  No device related complaints.  She notes intermittent palpitations.  They are not rapid but she notes an irregular heart beat.  She has an upcoming procedure with Dr. Perdomo for her mitral valve.   She is on amio and oral anticoagulation. No chest pain, edema, orthopnea or syncope.

## 2025-05-15 NOTE — PROCEDURE
[No] : not [NSR] : normal sinus rhythm [Pacemaker] : pacemaker [Threshold Testing Performed] : Threshold testing was performed [None] : none [de-identified] : [employer]tr[employer] [de-identified] : AAI<->DDD [de-identified] :  [de-identified] : 12.2 years [de-identified] : see man 5/8/25 AP 21/5%  23/5% 66% afib - all rate controlled

## 2025-06-11 NOTE — PLAN
[TextEntry] :  (1) mTEER scheduled 6/18 (2) Continue cardiology care and medication management with Dr. Ramos

## 2025-06-11 NOTE — REVIEW OF SYSTEMS
[Feeling Fatigued] : feeling fatigued [Lower Ext Edema] : lower extremity edema [Negative] : Neurological [Fever] : no fever [Headache] : no headache [Chills] : no chills [SOB] : no shortness of breath [Dyspnea on exertion] : dyspnea during exertion [Chest Discomfort] : no chest discomfort [Leg Claudication] : no intermittent leg claudication [Orthopnea] : no orthopnea [Palpitations] : no palpitations [PND] : no PND [Syncope] : no syncope [FreeTextEntry6] : dyspnea

## 2025-06-11 NOTE — REVIEW OF SYSTEMS
[Feeling Fatigued] : feeling fatigued [Lower Ext Edema] : lower extremity edema [Negative] : Neurological [Fever] : no fever [Headache] : no headache [Chills] : no chills [SOB] : no shortness of breath [Chest Discomfort] : no chest discomfort [Dyspnea on exertion] : dyspnea during exertion [Leg Claudication] : no intermittent leg claudication [Palpitations] : no palpitations [Orthopnea] : no orthopnea [PND] : no PND [Syncope] : no syncope [FreeTextEntry6] : dyspnea

## 2025-06-11 NOTE — HISTORY OF PRESENT ILLNESS
[FreeTextEntry1] : Referred by Dr. Ramos Nephrologist: Dr. Bishop  Patient's son in law, Srini present at follow up visit who helped with  services.   77F with PMH of asthma, OA, fatty liver, chronic dizziness, HTN, HLD, T2DM, Stage 3 CKD (baseline Cr 1.5-1.9), chronic anemia (baseline Hg ~10), atrial fibrillation s/p DCCV on 9/30/24 (on Xarelto), bifasicular block (LAFB and RBBB), non-obstructive CAD, HFpEF, severe low flow, low gradient aortic stenosis s/p transfemoral TAVR with Tawny Ultra (23mm) on 12/20/24 course was notable for CHB s/p PPM ([employer]tr[employer], 12/23/24) with recent hospitalization for ADHF found to have moderate to severe MR on TTE/MARCY, now presenting for follow up.   Discharge Summary [facility] Admission from 4/22 -4/27/25 The presented to [facility] ED on 4/22/25 as advised by Dr. Bishop d/t 2-week history of 20lb weight gain, worsening LE edema, and progressing RODRIGUEZ over the past 2 weeks. Patient states that she becomes short of breath when ambulating around her home that has been progressively worsening, occasionally associated with dizziness. Denies any palpitations chest pain, syncope, n/v/ fever, chills. Is compliant with medications.  Patient initially diuresed with Lasix IV 40mg BID. She was then transitioned to Lasix 40mg PO BID and will be discharged on home Bumex 1mg QD. She was optimized on GDMT including Entresto 24-26mg BID, Coreg 12.5mg BID, Spironolactone 25mg QD and Farxiga 10mg QD.   Discharge Weight: 89.3kg  Discharge Cr: 1.82  Structural heart team consulted given recent TAVR 12/24 and ongoing volume overload. Repeat TTE 4/23: LVSF normal with EF 67%, no RWMA, grade III DD, mildly enlarged RV cavity, LA mod dilated, RA mod dilated, peak transaortic velocity 3.46, peak transaortic gradient 47.9 mmHg, PASP 53 mmHg, trace pericardial effusion. MARCY (4/25/2025) revealing moderate-severe MR, good position of Tawny Valve with no intra-valvular or paravalvular regurgitation. Symptoms are likely from mitral regurgitation. No acute intervention this admission, patient can follow-up as outpatient for mitral valve intervention assessment with Dr. Perdomo within one week of discharge.   MARCY 4/25/25 CONCLUSIONS:  1. Left ventricular systolic function is normal.  2. Normal right ventricular cavity size and mildly reduced right ventricular systolic function.  3. Moderate to severe mitral regurgitation. Mechanism of mitral regurgitation appears to be mixed- mild prolapse of the tip of the central anterior scallop and mild tethering of the tip of the central posterior scallop.  4. A TAWNY 3 Ultra RESILIA (TAVR) valve replacement is present in the aortic position The prosthetic valve has normal leaflet excursion and is well seated with normal function. No intravalvular regurgitation No paravalvular regurgitation.  5. Mild pulmonary hypertension.  /25 CONCLUSIONS:  1. Mild left ventricular hypertrophy.  2. Left ventricular systolic function is normal with an ejection fraction of 67 % by Chahal's method of disks. There are no regional wall motion abnormalities seen.  3. There is severe (grade 3) left ventricular diastolic dysfunction, with elevated left ventricular filling pressure.  4. Mildly enlarged right ventricular cavity size and normal right ventricular systolic function.  5. Left atrium is moderately dilated.  6. Right atrium is moderately dilated.  7. 23 mm TAWNY 3 Ultra (TAVR) is present in the aortic position with no evidence of aortic regurgitation. Peak transaortic velocity is 3.46 m/s, peak transaortic gradient is 47.9 mmHg and mean transaortic gradient is 27.0 mmHg with an LVOT/aortic valve VTI ratio of 0.46. Aortic valve area is estimated at 1.28 cm by the continuity equation and indexed at 0.63 cm/m.  8. Estimated pulmonary artery systolic pressure is 53 mmHg, consistent with moderate pulmonary hypertension.  9. Trace pericardial effusion. 10. Compared to the transthoracic echocardiogram performed on 3/17/2025 and immediate post-TAVR TTE 12/21/2024, transaortic gradients are similarly elevated.  Discharge Labs 4/27/25 WBC 5.01 H/H 10.6/33.4 Plt 112 BUN/Cr 38/1.82   Discharge Medications: - Amiodarone 200mg QD - Coreg 12.5mg BID - Bumex 1mg QD  - Entresto 24-26mg BID - Spironolactone 25mg QD  - Buspirone 5mg PO QD - Xarelto 15mg QD  - Atorvastatin 40mg QD  - Farxiga 10mg QD  - Pantoprazole 40mg QD - Pioglitazone 45mg QD - Fluticasone-salmeterol 100-50mcg    Home Weight Trend: 194 lbs Home BP trend: SBP 90s- 120s    At last visit on 5/5/2025, patient was recommended to scheduled mTEER procedure.  Today, the patient states she is feeling better since her discharge, however, she continues to feel short of breath with exertion and LE edema.  She denies chest pain, shortness of breath at rest, dizziness, syncope, orthopnea, PND, or LE edema.    mTEER work up check list [x] TTE [x] MARCY [X ] CV surgical evaluation- seen by Dr. Mancilla in the hospital on 04/25/2025  [x] coronary assessment - CCTA 12/12/24; cardiac cath 7/10/24 [x] Carotid US 12/3/24 [X] KCCQ [x] EKG [x ] pre-procedure labs- antibodies negative

## 2025-06-11 NOTE — PHYSICAL EXAM
[No Acute Distress] : no acute distress [No Carotid Bruit] : no carotid bruit [Clear Lung Fields] : clear lung fields [Soft] : abdomen soft [Non Tender] : non-tender [Normal Gait] : normal gait [No Rash] : no rash [Moves all extremities] : moves all extremities [Alert and Oriented] : alert and oriented [de-identified] : irregular, + systolic ejection murmur at apex  [de-identified] : trace edema bilaterally

## 2025-06-11 NOTE — PHYSICAL EXAM
[No Acute Distress] : no acute distress [No Carotid Bruit] : no carotid bruit [Clear Lung Fields] : clear lung fields [Soft] : abdomen soft [Non Tender] : non-tender [Normal Gait] : normal gait [No Rash] : no rash [Moves all extremities] : moves all extremities [Alert and Oriented] : alert and oriented [de-identified] : irregular, + systolic ejection murmur at apex  [de-identified] : trace edema bilaterally

## 2025-06-11 NOTE — END OF VISIT
[FreeTextEntry3] : I, Deanne Vital, am scribing for Dr. Chucho Perdomo the following sections: HISTORY OF PRESENT ILLNESS, PAST MEDICAL/FAMILY/SOCIAL HISTORY, REVIEW OF SYSTEMS, VITAL SIGNS, PHYSICAL EXAM AND DISPOSITION.    I personally performed the services described in the documentation and reviewed the documented recorded by the scribe in my presence; it accurately and completely records my words and actions.

## 2025-06-11 NOTE — ASSESSMENT
[FreeTextEntry1] : 77F with PMH of asthma, OA, fatty liver, chronic dizziness, HTN, HLD, T2DM, Stage 3 CKD (baseline Cr 1.5-1.9), chronic anemia (baseline Hg ~10), atrial fibrillation s/p DCCV on 9/30/24 (on Xarelto), bifasicular block (LAFB and RBBB), non-obstructive CAD, HFpEF, severe low flow, low gradient aortic stenosis s/p transfemoral TAVR with Tawny Ultra (23mm) on 12/20/24 course was notable for CHB s/p PPM ([employer], 12/23/24) with recent hospitalization for ADHF found to have moderate to severe MR on TTE/MARCY, now presenting for follow up.  The patient is clinically stable; NYHA Class III.  The patient presents today for mTEER planning and pre-procedure blood work.  The mTEER procedure was discussed in detail with the risks of clip detachment, bleeding, stroke, etc. discussed with the patient and her son-in-law.  The patient is scheduled for mTEER procedure on 6/18/2025.  All questions answered.

## 2025-06-11 NOTE — HISTORY OF PRESENT ILLNESS
[FreeTextEntry1] : Referred by Dr. Ramos Nephrologist: Dr. Bishop  Patient's son in law, Srini present at follow up visit who helped with  services.   77F with PMH of asthma, OA, fatty liver, chronic dizziness, HTN, HLD, T2DM, Stage 3 CKD (baseline Cr 1.5-1.9), chronic anemia (baseline Hg ~10), atrial fibrillation s/p DCCV on 9/30/24 (on Xarelto), bifasicular block (LAFB and RBBB), non-obstructive CAD, HFpEF, severe low flow, low gradient aortic stenosis s/p transfemoral TAVR with Tawny Ultra (23mm) on 12/20/24 course was notable for CHB s/p PPM ([employer], 12/23/24) with recent hospitalization for ADHF found to have moderate to severe MR on TTE/MARCY, now presenting for follow up.   Discharge Summary [facility] Admission from 4/22 -4/27/25 The presented to [facility] ED on 4/22/25 as advised by Dr. Bishop d/t 2-week history of 20lb weight gain, worsening LE edema, and progressing RODRIGUEZ over the past 2 weeks. Patient states that she becomes short of breath when ambulating around her home that has been progressively worsening, occasionally associated with dizziness. Denies any palpitations chest pain, syncope, n/v/ fever, chills. Is compliant with medications.  Patient initially diuresed with Lasix IV 40mg BID. She was then transitioned to Lasix 40mg PO BID and will be discharged on home Bumex 1mg QD. She was optimized on GDMT including Entresto 24-26mg BID, Coreg 12.5mg BID, Spironolactone 25mg QD and Farxiga 10mg QD.   Discharge Weight: 89.3kg  Discharge Cr: 1.82  Structural heart team consulted given recent TAVR 12/24 and ongoing volume overload. Repeat TTE 4/23: LVSF normal with EF 67%, no RWMA, grade III DD, mildly enlarged RV cavity, LA mod dilated, RA mod dilated, peak transaortic velocity 3.46, peak transaortic gradient 47.9 mmHg, PASP 53 mmHg, trace pericardial effusion. MARCY (4/25/2025) revealing moderate-severe MR, good position of Tawny Valve with no intra-valvular or paravalvular regurgitation. Symptoms are likely from mitral regurgitation. No acute intervention this admission, patient can follow-up as outpatient for mitral valve intervention assessment with Dr. Perdomo within one week of discharge.   MARCY 4/25/25 CONCLUSIONS:  1. Left ventricular systolic function is normal.  2. Normal right ventricular cavity size and mildly reduced right ventricular systolic function.  3. Moderate to severe mitral regurgitation. Mechanism of mitral regurgitation appears to be mixed- mild prolapse of the tip of the central anterior scallop and mild tethering of the tip of the central posterior scallop.  4. A TAWNY 3 Ultra RESILIA (TAVR) valve replacement is present in the aortic position The prosthetic valve has normal leaflet excursion and is well seated with normal function. No intravalvular regurgitation No paravalvular regurgitation.  5. Mild pulmonary hypertension.  /25 CONCLUSIONS:  1. Mild left ventricular hypertrophy.  2. Left ventricular systolic function is normal with an ejection fraction of 67 % by Chahal's method of disks. There are no regional wall motion abnormalities seen.  3. There is severe (grade 3) left ventricular diastolic dysfunction, with elevated left ventricular filling pressure.  4. Mildly enlarged right ventricular cavity size and normal right ventricular systolic function.  5. Left atrium is moderately dilated.  6. Right atrium is moderately dilated.  7. 23 mm TAWNY 3 Ultra (TAVR) is present in the aortic position with no evidence of aortic regurgitation. Peak transaortic velocity is 3.46 m/s, peak transaortic gradient is 47.9 mmHg and mean transaortic gradient is 27.0 mmHg with an LVOT/aortic valve VTI ratio of 0.46. Aortic valve area is estimated at 1.28 cm by the continuity equation and indexed at 0.63 cm/m.  8. Estimated pulmonary artery systolic pressure is 53 mmHg, consistent with moderate pulmonary hypertension.  9. Trace pericardial effusion. 10. Compared to the transthoracic echocardiogram performed on 3/17/2025 and immediate post-TAVR TTE 12/21/2024, transaortic gradients are similarly elevated.  Discharge Labs 4/27/25 WBC 5.01 H/H 10.6/33.4 Plt 112 BUN/Cr 38/1.82   Discharge Medications: - Amiodarone 200mg QD - Coreg 12.5mg BID - Bumex 1mg QD  - Entresto 24-26mg BID - Spironolactone 25mg QD  - Buspirone 5mg PO QD - Xarelto 15mg QD  - Atorvastatin 40mg QD  - Farxiga 10mg QD  - Pantoprazole 40mg QD - Pioglitazone 45mg QD - Fluticasone-salmeterol 100-50mcg    Home Weight Trend: 194 lbs Home BP trend: SBP 90s- 120s    At last visit on 5/5/2025, patient was recommended to scheduled mTEER procedure.  Today, the patient states she is feeling better since her discharge, however, she continues to feel short of breath with exertion and LE edema.  She denies chest pain, shortness of breath at rest, dizziness, syncope, orthopnea, PND, or LE edema.    mTEER work up check list [x] TTE [x] MRACY [X ] CV surgical evaluation- seen by Dr. Mancilla in the hospital on 04/25/2025  [x] coronary assessment - CCTA 12/12/24; cardiac cath 7/10/24 [x] Carotid US 12/3/24 [X] KCCQ [x] EKG [x ] pre-procedure labs- antibodies negative

## 2025-06-27 NOTE — REASON FOR VISIT
[Home] : at home, [unfilled] , at the time of the visit. [Medical Office: ([facility])___] : at the medical office located in  [Telehealth (audio & video)] : This visit was provided via telehealth using real-time 2-way audio visual technology. [Verbal consent obtained from patient] : the patient, [unfilled] [FreeTextEntry4] : son-in-law: Trent Ruelas  [FreeTextEntry1] : Interpretation performed by son-in-law per patient's preference.

## 2025-06-27 NOTE — HISTORY OF PRESENT ILLNESS
[FreeTextEntry1] : Referred by Dr. Ramos Nephrologist: Dr. Bishop  78F, Solomon Islander speaking, with PMH of asthma, OA, fatty liver, HTN, HLD, T2DM, Stage 3 CKD (baseline Cr 1.5-1.9), chronic anemia (baseline Hg  10), atrial fibrillation s/p DCCV on chronic anemia (baseline Hg 10), atrial fibrillation s/p DCCV on 24 (on Xarelto), non-obstructive CAD, HFpEF, severe low flow, low gradient aortic stenosis s/p transfemoral TAVR with Tawny Ultra (23mm) on 24 course was notable for CHB s/p PPM ([employer], 24), and severe MR s/p mTEER via R-CFV with placement of a single April ACE in the medical A2/P2 position with MR reduction from severe to mild, MG post procedure was 4.5 on 25, who presents for her post hospital discharge follow up.   Discharge Summary [facility] Admission from -25 presented to [facility] on 25 and underwent mTEERx1. Patient recovered on 9LA as mini ICU. EP reprogrammed PPM. Hypotensive post op requiring Levo. POD1, still hypotensive in AM, thought to be dry, given Albumin 250cc x2 w/ noted bump in Cr/ BUN. Eventually weaned off Levo w/ renal function plateauing in afternoon. TTE LVEF 44%, normal RV size and function, TAVR in place normal function, April device in place, trace MR. Overnight placed on , Albin, and Levo for continued hypotension and increased Cr/BUN. POD2, weaned off all pressor support in AM after Midodrine 10mg given at 3AM. Remained on bedside flow tracker w/ CI 2.5 and CO 4.7. Started on hep gtt overnight. EP consulted, increased PPM rate to 80, BPs increased to 160s without Midodrine. POD3 Cr downtrending, HF consulted. Restarted home Amio and Farxiga. Switched from hep gtt to home Xarelto. PPM rate back down to 60bpm. POD4 Reyes removed and patient voided. Restarted 1/2 dose of home Coreg and Spironolactone. Tolerated well, patient stepped down and cleared for discharge home per Dr. Mancilla.  Discharge TTE 25 CONCLUSIONS:  1. Left ventricular cavity is normal in size. Left ventricular wall thickness is normal. Left ventricular systolic function is mildly decreased with an ejection fraction of 44 % by Chahal's method of disks.  2. Normal right ventricular cavity size, with normal wall thickness, and normal right ventricular systolic function.  3. Left atrium is severely dilated.  4. A TAWNY 3 Ultra RESILIA (TAVR) valve replacement is present in the aortic position The prosthetic valve has normal function. No intravalvular regurgitation No paravalvular regurgitation.  5. The peak transaortic velocity is 3.10 m/s, peak transaortic gradient is 38.4 mmHg and mean transaortic gradient is 23.0 mmHg with an LVOT/aortic valve VTI ratio of 0.33. The effective orifice area is estimated at 1.03 cm by the continuity equation.  6. Percutaneous [id number] mitral repair device with a single APRIL device.  7. Trace mitral regurgitation.  8. The transmitral mean gradient is 5.00 mmHg at a heart rate of 50 bpm.  9. Mild pulmonary hypertension. 10. No pericardial effusion seen.   Discharge Labs 25 WBC 5.75 H/H 10.1/31.5 Plt 98 BUN/Cr 42/1.68   Upcoming follow up appointments: Dr. Ramos on  at 11:45am  Medication changes: off Entresto; started on Spironolactone and Bumex    Today, patient reports ... Home weight: 190 lbs today (discharge weight 194 lbs).   Home BP: 130s  Today, the patient reports feeling fine, occasionally feeling fatigue that she attributes to the heat. Able to walk around her apartment for 30 minutes, have not gone outside due to the heat wave, uses cane for balance with ambulation. Continues to have dyspnea with exertion with ambulation around the house, doesn't notice any change compared to pre-procedure. Access sites are healing well.  Denies any recent chest pain, palpitations, lightheadedness/dizziness or syncope, orthopnea, PND, LE edema, abdominal bloating, fever, chills, night sweats, or dysuria. Has been salt-restricting her diet.

## 2025-06-27 NOTE — ASSESSMENT
[FreeTextEntry1] : 78F, Pakistani speaking, with PMH of asthma, OA, fatty liver, HTN, HLD, T2DM, Stage 3 CKD (baseline Cr 1.5-1.9), chronic anemia (baseline Hg10), atrial fibrillation s/p DCCV on chronic anemia (baseline Hg 10), atrial fibrillation s/p DCCV on 9/30/24 (on Xarelto), non-obstructive CAD, HFpEF, severe low flow, low gradient aortic stenosis s/p transfemoral TAVR with Tawny Ultra (23mm) on 12/20/24 course was notable for CHB s/p PPM ([employer], 12/23/24), and severe MR s/p mTEER via R-CFV with placement of a single Rebecca ACE in the medical A2/P2 position with MR reduction from severe to mild, MG post procedure was 4.5 on 6/18/25, who presents for her post hospital discharge follow up.  The patient is clinically stable, NYHA Class II/III, reports some subtle symptom improvement since discharge, no concerning symptoms of volume overload, and access sites are healing appropriately. The ECHO done prior to discharge was reviewed. The patient has an upcoming follow up visit with his cardiologist on July 10th, asked patient to have Dr. Ramos's office repeat blood work, specifically CMP to ensure renal function and K stability. Reminded patient to avoid routine dental visit/procedure for the next 3 months and that IE Abx ppx prior to any dental procedure is needed indefinitely moving forward. The patient will return in 3-4 weeks with ECHO/EKG/labs for her 1-month post mTEER follow up. All questions were answered.

## 2025-06-27 NOTE — REVIEW OF SYSTEMS
[Fever] : no fever [Headache] : no headache [Chills] : no chills [Feeling Fatigued] : feeling fatigued [Negative] : Heme/Lymph [FreeTextEntry5] : see HPI  [FreeTextEntry9] : see HPI

## 2025-06-27 NOTE — PLAN
[TextEntry] :  - continue current medication regimen. - IE Abx ppx 30-60 min prior to dental procedure. - follow up with Dr. Ramos as scheduled for ongoing cardiology care with repeat CMP.  - return to [facility] in 3-5 weeks with TTE and a visit with a structural heart clinician for her 1-month post mTEER visit, or as needed. - Symptoms to call our office and ED precaution provided.

## 2025-06-27 NOTE — HISTORY OF PRESENT ILLNESS
[FreeTextEntry1] : Referred by Dr. Ramos Nephrologist: Dr. Bishop  78F, Icelandic speaking, with PMH of asthma, OA, fatty liver, HTN, HLD, T2DM, Stage 3 CKD (baseline Cr 1.5-1.9), chronic anemia (baseline Hg  10), atrial fibrillation s/p DCCV on chronic anemia (baseline Hg 10), atrial fibrillation s/p DCCV on 24 (on Xarelto), non-obstructive CAD, HFpEF, severe low flow, low gradient aortic stenosis s/p transfemoral TAVR with Tawny Ultra (23mm) on 24 course was notable for CHB s/p PPM (Medtronic, 24), and severe MR s/p mTEER via R-CFV with placement of a single April ACE in the medical A2/P2 position with MR reduction from severe to mild, MG post procedure was 4.5 on 25, who presents for her post hospital discharge follow up.   Discharge Summary [facility] Admission from -25 presented to [facility] on 25 and underwent mTEERx1. Patient recovered on 9LA as mini ICU. EP reprogrammed PPM. Hypotensive post op requiring Levo. POD1, still hypotensive in AM, thought to be dry, given Albumin 250cc x2 w/ noted bump in Cr/ BUN. Eventually weaned off Levo w/ renal function plateauing in afternoon. TTE LVEF 44%, normal RV size and function, TAVR in place normal function, April device in place, trace MR. Overnight placed on , Albin, and Levo for continued hypotension and increased Cr/BUN. POD2, weaned off all pressor support in AM after Midodrine 10mg given at 3AM. Remained on bedside flow tracker w/ CI 2.5 and CO 4.7. Started on hep gtt overnight. EP consulted, increased PPM rate to 80, BPs increased to 160s without Midodrine. POD3 Cr downtrending, HF consulted. Restarted home Amio and Farxiga. Switched from hep gtt to home Xarelto. PPM rate back down to 60bpm. POD4 Reyes removed and patient voided. Restarted 1/2 dose of home Coreg and Spironolactone. Tolerated well, patient stepped down and cleared for discharge home per Dr. Mancilla.  Discharge TTE 25 CONCLUSIONS:  1. Left ventricular cavity is normal in size. Left ventricular wall thickness is normal. Left ventricular systolic function is mildly decreased with an ejection fraction of 44 % by Chahal's method of disks.  2. Normal right ventricular cavity size, with normal wall thickness, and normal right ventricular systolic function.  3. Left atrium is severely dilated.  4. A TAWNY 3 Ultra RESILIA (TAVR) valve replacement is present in the aortic position The prosthetic valve has normal function. No intravalvular regurgitation No paravalvular regurgitation.  5. The peak transaortic velocity is 3.10 m/s, peak transaortic gradient is 38.4 mmHg and mean transaortic gradient is 23.0 mmHg with an LVOT/aortic valve VTI ratio of 0.33. The effective orifice area is estimated at 1.03 cm by the continuity equation.  6. Percutaneous [id number]-[id number] mitral repair device with a single APRIL device.  7. Trace mitral regurgitation.  8. The transmitral mean gradient is 5.00 mmHg at a heart rate of 50 bpm.  9. Mild pulmonary hypertension. 10. No pericardial effusion seen.   Discharge Labs 25 WBC 5.75 H/H 10.1/31.5 Plt 98 BUN/Cr 42/1.68   Upcoming follow up appointments: Dr. Ramos on  at 11:45am  Medication changes: off Entresto; started on Spironolactone and Bumex    Today, patient reports ... Home weight: 190 lbs today (discharge weight 194 lbs).   Home BP: 130s  Today, the patient reports feeling fine, occasionally feeling fatigue that she attributes to the heat. Able to walk around her apartment for 30 minutes, have not gone outside due to the heat wave, uses cane for balance with ambulation. Continues to have dyspnea with exertion with ambulation around the house, doesn't notice any change compared to pre-procedure. Access sites are healing well.  Denies any recent chest pain, palpitations, lightheadedness/dizziness or syncope, orthopnea, PND, LE edema, abdominal bloating, fever, chills, night sweats, or dysuria. Has been salt-restricting her diet.

## 2025-06-27 NOTE — ASSESSMENT
[FreeTextEntry1] : 78F, Barbadian speaking, with PMH of asthma, OA, fatty liver, HTN, HLD, T2DM, Stage 3 CKD (baseline Cr 1.5-1.9), chronic anemia (baseline Hg10), atrial fibrillation s/p DCCV on chronic anemia (baseline Hg 10), atrial fibrillation s/p DCCV on 9/30/24 (on Xarelto), non-obstructive CAD, HFpEF, severe low flow, low gradient aortic stenosis s/p transfemoral TAVR with Tawny Ultra (23mm) on 12/20/24 course was notable for CHB s/p PPM ([employer]tr[employer], 12/23/24), and severe MR s/p mTEER via R-CFV with placement of a single Rebecca ACE in the medical A2/P2 position with MR reduction from severe to mild, MG post procedure was 4.5 on 6/18/25, who presents for her post hospital discharge follow up.  The patient is clinically stable, NYHA Class II/III, reports some subtle symptom improvement since discharge, no concerning symptoms of volume overload, and access sites are healing appropriately. The ECHO done prior to discharge was reviewed. The patient has an upcoming follow up visit with his cardiologist on July 10th, asked patient to have Dr. Ramos's office repeat blood work, specifically CMP to ensure renal function and K stability. Reminded patient to avoid routine dental visit/procedure for the next 3 months and that IE Abx ppx prior to any dental procedure is needed indefinitely moving forward. The patient will return in 3-4 weeks with ECHO/EKG/labs for her 1-month post mTEER follow up. All questions were answered.

## 2025-06-27 NOTE — REASON FOR VISIT
[Home] : at home, [unfilled] , at the time of the visit. [Medical Office: ([facility])___] : at the medical office located in  [Telehealth (audio & video)] : This visit was provided via telehealth using real-time 2-way audio visual technology. [Verbal consent obtained from patient] : the patient, [unfilled] [FreeTextEntry4] : son-in-law: Trent Ruelas  [FreeTextEntry1] : Interpretation performed by son-in-law per patient's preference.